# Patient Record
Sex: FEMALE | Race: WHITE | NOT HISPANIC OR LATINO
[De-identification: names, ages, dates, MRNs, and addresses within clinical notes are randomized per-mention and may not be internally consistent; named-entity substitution may affect disease eponyms.]

---

## 2017-01-13 ENCOUNTER — APPOINTMENT (OUTPATIENT)
Dept: FAMILY MEDICINE | Facility: CLINIC | Age: 72
End: 2017-01-13

## 2017-01-13 VITALS
HEART RATE: 68 BPM | SYSTOLIC BLOOD PRESSURE: 122 MMHG | HEIGHT: 60 IN | DIASTOLIC BLOOD PRESSURE: 70 MMHG | WEIGHT: 186.25 LBS | BODY MASS INDEX: 36.57 KG/M2

## 2017-01-13 DIAGNOSIS — F41.8 OTHER SPECIFIED ANXIETY DISORDERS: ICD-10-CM

## 2017-01-15 LAB
25(OH)D3 SERPL-MCNC: 29.5 NG/ML
ALBUMIN SERPL ELPH-MCNC: 4 G/DL
ALP BLD-CCNC: 102 U/L
ALT SERPL-CCNC: 51 U/L
ANION GAP SERPL CALC-SCNC: 14 MMOL/L
AST SERPL-CCNC: 35 U/L
BASOPHILS # BLD AUTO: 0.02 K/UL
BASOPHILS NFR BLD AUTO: 0.4 %
BILIRUB SERPL-MCNC: 0.6 MG/DL
BUN SERPL-MCNC: 11 MG/DL
CALCIUM SERPL-MCNC: 9.6 MG/DL
CHLORIDE SERPL-SCNC: 104 MMOL/L
CHOLEST SERPL-MCNC: 204 MG/DL
CHOLEST/HDLC SERPL: 2.9 RATIO
CO2 SERPL-SCNC: 26 MMOL/L
CREAT SERPL-MCNC: 0.74 MG/DL
EOSINOPHIL # BLD AUTO: 0.13 K/UL
EOSINOPHIL NFR BLD AUTO: 2.3 %
GLUCOSE SERPL-MCNC: 110 MG/DL
HBA1C MFR BLD HPLC: 5.9 %
HCT VFR BLD CALC: 41.9 %
HDLC SERPL-MCNC: 70 MG/DL
HGB BLD-MCNC: 14 G/DL
IMM GRANULOCYTES NFR BLD AUTO: 0.2 %
LDLC SERPL CALC-MCNC: 108 MG/DL
LYMPHOCYTES # BLD AUTO: 1.95 K/UL
LYMPHOCYTES NFR BLD AUTO: 34.8 %
MAN DIFF?: NORMAL
MCHC RBC-ENTMCNC: 30.9 PG
MCHC RBC-ENTMCNC: 33.4 GM/DL
MCV RBC AUTO: 92.5 FL
MONOCYTES # BLD AUTO: 0.43 K/UL
MONOCYTES NFR BLD AUTO: 7.7 %
NEUTROPHILS # BLD AUTO: 3.06 K/UL
NEUTROPHILS NFR BLD AUTO: 54.6 %
PLATELET # BLD AUTO: 196 K/UL
POTASSIUM SERPL-SCNC: 4 MMOL/L
PROT SERPL-MCNC: 7 G/DL
RBC # BLD: 4.53 M/UL
RBC # FLD: 14.6 %
SODIUM SERPL-SCNC: 144 MMOL/L
TRIGL SERPL-MCNC: 130 MG/DL
TSH SERPL-ACNC: 0.95 UIU/ML
WBC # FLD AUTO: 5.6 K/UL

## 2017-03-09 ENCOUNTER — APPOINTMENT (OUTPATIENT)
Dept: FAMILY MEDICINE | Facility: CLINIC | Age: 72
End: 2017-03-09

## 2017-05-09 ENCOUNTER — APPOINTMENT (OUTPATIENT)
Dept: UROGYNECOLOGY | Facility: CLINIC | Age: 72
End: 2017-05-09

## 2017-05-09 VITALS
SYSTOLIC BLOOD PRESSURE: 146 MMHG | DIASTOLIC BLOOD PRESSURE: 81 MMHG | HEIGHT: 60 IN | BODY MASS INDEX: 35.53 KG/M2 | WEIGHT: 181 LBS

## 2017-05-09 DIAGNOSIS — Z87.09 PERSONAL HISTORY OF OTHER DISEASES OF THE RESPIRATORY SYSTEM: ICD-10-CM

## 2017-05-09 DIAGNOSIS — K57.90 DIVERTICULOSIS OF INTESTINE, PART UNSPECIFIED, W/OUT PERFORATION OR ABSCESS W/OUT BLEEDING: ICD-10-CM

## 2017-05-09 DIAGNOSIS — K59.00 CONSTIPATION, UNSPECIFIED: ICD-10-CM

## 2017-05-09 DIAGNOSIS — Z87.42 PERSONAL HISTORY OF OTHER DISEASES OF THE FEMALE GENITAL TRACT: ICD-10-CM

## 2017-05-09 LAB
BILIRUB UR QL STRIP: NEGATIVE
CLARITY UR: CLEAR
COLLECTION METHOD: NORMAL
GLUCOSE UR-MCNC: NEGATIVE
HCG UR QL: 0.2 EU/DL
HGB UR QL STRIP.AUTO: NEGATIVE
KETONES UR-MCNC: NEGATIVE
LEUKOCYTE ESTERASE UR QL STRIP: NEGATIVE
NITRITE UR QL STRIP: NEGATIVE
PH UR STRIP: 6
PROT UR STRIP-MCNC: NEGATIVE
SP GR UR STRIP: 1

## 2017-05-10 ENCOUNTER — RESULT REVIEW (OUTPATIENT)
Age: 72
End: 2017-05-10

## 2017-05-11 ENCOUNTER — APPOINTMENT (OUTPATIENT)
Dept: UROGYNECOLOGY | Facility: CLINIC | Age: 72
End: 2017-05-11

## 2017-05-11 LAB — BACTERIA UR CULT: NORMAL

## 2017-05-25 ENCOUNTER — APPOINTMENT (OUTPATIENT)
Dept: UROGYNECOLOGY | Facility: CLINIC | Age: 72
End: 2017-05-25

## 2017-08-09 ENCOUNTER — APPOINTMENT (OUTPATIENT)
Dept: FAMILY MEDICINE | Facility: CLINIC | Age: 72
End: 2017-08-09
Payer: MEDICARE

## 2017-08-09 VITALS
SYSTOLIC BLOOD PRESSURE: 122 MMHG | OXYGEN SATURATION: 98 % | HEIGHT: 60 IN | WEIGHT: 187.38 LBS | HEART RATE: 65 BPM | DIASTOLIC BLOOD PRESSURE: 74 MMHG | BODY MASS INDEX: 36.79 KG/M2

## 2017-08-09 DIAGNOSIS — Z87.448 PERSONAL HISTORY OF OTHER DISEASES OF URINARY SYSTEM: ICD-10-CM

## 2017-08-09 DIAGNOSIS — R33.9 RETENTION OF URINE, UNSPECIFIED: ICD-10-CM

## 2017-08-09 DIAGNOSIS — Q80.0 ICHTHYOSIS VULGARIS: ICD-10-CM

## 2017-08-09 DIAGNOSIS — I51.9 HEART DISEASE, UNSPECIFIED: ICD-10-CM

## 2017-08-09 DIAGNOSIS — Z87.09 PERSONAL HISTORY OF OTHER DISEASES OF THE RESPIRATORY SYSTEM: ICD-10-CM

## 2017-08-09 PROCEDURE — 99244 OFF/OP CNSLTJ NEW/EST MOD 40: CPT

## 2017-08-09 RX ORDER — VALSARTAN 160 MG
CAPSULE ORAL
Refills: 0 | Status: DISCONTINUED | COMMUNITY
End: 2017-08-09

## 2017-08-09 RX ORDER — ATORVASTATIN CALCIUM 80 MG/1
TABLET, FILM COATED ORAL
Refills: 0 | Status: DISCONTINUED | COMMUNITY
End: 2017-08-09

## 2017-08-09 RX ORDER — CLOPIDOGREL 75 MG/1
TABLET, FILM COATED ORAL
Refills: 0 | Status: DISCONTINUED | COMMUNITY
End: 2017-08-09

## 2017-08-09 RX ORDER — ZOLPIDEM TARTRATE 5 MG/1
TABLET, FILM COATED ORAL
Refills: 0 | Status: DISCONTINUED | COMMUNITY
End: 2017-08-09

## 2017-08-09 RX ORDER — PANTOPRAZOLE SODIUM 40 MG/1
40 GRANULE, DELAYED RELEASE ORAL
Refills: 0 | Status: DISCONTINUED | COMMUNITY
End: 2017-08-09

## 2017-08-09 RX ORDER — ASPIRIN 81 MG/1
81 TABLET ORAL
Refills: 0 | Status: ACTIVE | COMMUNITY
Start: 2017-08-09

## 2017-08-09 RX ORDER — LEVOTHYROXINE SODIUM 137 UG/1
TABLET ORAL
Refills: 0 | Status: DISCONTINUED | COMMUNITY
End: 2017-08-09

## 2017-08-18 ENCOUNTER — RX RENEWAL (OUTPATIENT)
Age: 72
End: 2017-08-18

## 2017-08-21 ENCOUNTER — APPOINTMENT (OUTPATIENT)
Dept: FAMILY MEDICINE | Facility: CLINIC | Age: 72
End: 2017-08-21
Payer: COMMERCIAL

## 2017-08-21 VITALS
DIASTOLIC BLOOD PRESSURE: 80 MMHG | TEMPERATURE: 98.7 F | WEIGHT: 185 LBS | HEIGHT: 60 IN | SYSTOLIC BLOOD PRESSURE: 120 MMHG | BODY MASS INDEX: 36.32 KG/M2

## 2017-08-21 PROCEDURE — 99214 OFFICE O/P EST MOD 30 MIN: CPT

## 2017-08-28 ENCOUNTER — RX RENEWAL (OUTPATIENT)
Age: 72
End: 2017-08-28

## 2017-09-14 ENCOUNTER — APPOINTMENT (OUTPATIENT)
Dept: FAMILY MEDICINE | Facility: CLINIC | Age: 72
End: 2017-09-14
Payer: COMMERCIAL

## 2017-09-14 ENCOUNTER — MED ADMIN CHARGE (OUTPATIENT)
Age: 72
End: 2017-09-14

## 2017-09-14 VITALS
SYSTOLIC BLOOD PRESSURE: 120 MMHG | TEMPERATURE: 98.3 F | HEART RATE: 62 BPM | RESPIRATION RATE: 16 BRPM | DIASTOLIC BLOOD PRESSURE: 70 MMHG | WEIGHT: 185 LBS | BODY MASS INDEX: 36.32 KG/M2 | HEIGHT: 60 IN | OXYGEN SATURATION: 98 %

## 2017-09-14 DIAGNOSIS — Z87.09 PERSONAL HISTORY OF OTHER DISEASES OF THE RESPIRATORY SYSTEM: ICD-10-CM

## 2017-09-14 PROCEDURE — 90662 IIV NO PRSV INCREASED AG IM: CPT

## 2017-09-14 PROCEDURE — 99213 OFFICE O/P EST LOW 20 MIN: CPT | Mod: 25

## 2017-09-14 PROCEDURE — 96372 THER/PROPH/DIAG INJ SC/IM: CPT

## 2017-09-14 PROCEDURE — G0008: CPT

## 2017-09-14 RX ORDER — METHYLPRED ACET/NACL,ISO-OS/PF 40 MG/ML
40 VIAL (ML) INJECTION
Qty: 1 | Refills: 0 | Status: COMPLETED | OUTPATIENT
Start: 2017-09-14

## 2017-09-14 RX ORDER — CEFPROZIL 500 MG/1
500 TABLET ORAL
Qty: 20 | Refills: 0 | Status: DISCONTINUED | COMMUNITY
Start: 2017-08-21 | End: 2017-09-14

## 2017-09-14 RX ADMIN — METHYLPREDNISOLONE ACETATE 0 MG/ML: 40 INJECTION, SUSPENSION INTRA-ARTICULAR; INTRALESIONAL; INTRAMUSCULAR; SOFT TISSUE at 00:00

## 2017-09-20 ENCOUNTER — APPOINTMENT (OUTPATIENT)
Dept: FAMILY MEDICINE | Facility: CLINIC | Age: 72
End: 2017-09-20
Payer: COMMERCIAL

## 2017-09-20 VITALS
HEIGHT: 60 IN | DIASTOLIC BLOOD PRESSURE: 80 MMHG | TEMPERATURE: 98.9 F | OXYGEN SATURATION: 98 % | WEIGHT: 185 LBS | SYSTOLIC BLOOD PRESSURE: 136 MMHG | HEART RATE: 72 BPM | BODY MASS INDEX: 36.32 KG/M2

## 2017-09-20 PROCEDURE — 99213 OFFICE O/P EST LOW 20 MIN: CPT

## 2017-09-20 RX ORDER — METHYLPREDNISOLONE 4 MG/1
4 TABLET ORAL
Qty: 1 | Refills: 0 | Status: DISCONTINUED | COMMUNITY
Start: 2017-09-14 | End: 2017-09-20

## 2017-09-20 RX ORDER — MULTIVIT-MIN/FOLIC/VIT K/LYCOP 400-300MCG
1000 TABLET ORAL DAILY
Refills: 0 | Status: DISCONTINUED | COMMUNITY
End: 2017-09-20

## 2017-09-20 RX ORDER — AZELASTINE HYDROCHLORIDE 205.5 UG/1
0.15 SPRAY, METERED NASAL
Qty: 1 | Refills: 3 | Status: DISCONTINUED | COMMUNITY
Start: 2017-01-13 | End: 2017-09-20

## 2017-12-14 ENCOUNTER — RX RENEWAL (OUTPATIENT)
Age: 72
End: 2017-12-14

## 2018-01-08 ENCOUNTER — RX RENEWAL (OUTPATIENT)
Age: 73
End: 2018-01-08

## 2018-01-26 ENCOUNTER — RX RENEWAL (OUTPATIENT)
Age: 73
End: 2018-01-26

## 2018-03-08 ENCOUNTER — RESULT REVIEW (OUTPATIENT)
Age: 73
End: 2018-03-08

## 2018-03-14 ENCOUNTER — NON-APPOINTMENT (OUTPATIENT)
Age: 73
End: 2018-03-14

## 2018-03-14 ENCOUNTER — APPOINTMENT (OUTPATIENT)
Dept: FAMILY MEDICINE | Facility: CLINIC | Age: 73
End: 2018-03-14
Payer: MEDICARE

## 2018-03-14 VITALS
BODY MASS INDEX: 36.4 KG/M2 | TEMPERATURE: 97.7 F | HEIGHT: 60 IN | WEIGHT: 185.44 LBS | SYSTOLIC BLOOD PRESSURE: 134 MMHG | DIASTOLIC BLOOD PRESSURE: 82 MMHG | OXYGEN SATURATION: 99 % | HEART RATE: 88 BPM

## 2018-03-14 DIAGNOSIS — Z87.19 PERSONAL HISTORY OF OTHER DISEASES OF THE DIGESTIVE SYSTEM: ICD-10-CM

## 2018-03-14 DIAGNOSIS — Z87.898 PERSONAL HISTORY OF OTHER SPECIFIED CONDITIONS: ICD-10-CM

## 2018-03-14 DIAGNOSIS — Z01.818 ENCOUNTER FOR OTHER PREPROCEDURAL EXAMINATION: ICD-10-CM

## 2018-03-14 DIAGNOSIS — J31.0 CHRONIC RHINITIS: ICD-10-CM

## 2018-03-14 DIAGNOSIS — R10.2 PELVIC AND PERINEAL PAIN: ICD-10-CM

## 2018-03-14 DIAGNOSIS — Z92.29 PERSONAL HISTORY OF OTHER DRUG THERAPY: ICD-10-CM

## 2018-03-14 DIAGNOSIS — Z86.39 PERSONAL HISTORY OF OTHER ENDOCRINE, NUTRITIONAL AND METABOLIC DISEASE: ICD-10-CM

## 2018-03-14 PROCEDURE — 99214 OFFICE O/P EST MOD 30 MIN: CPT | Mod: 25

## 2018-03-14 PROCEDURE — 94010 BREATHING CAPACITY TEST: CPT

## 2018-03-14 PROCEDURE — 36415 COLL VENOUS BLD VENIPUNCTURE: CPT

## 2018-03-14 RX ORDER — BENZONATATE 200 MG/1
200 CAPSULE ORAL
Qty: 30 | Refills: 0 | Status: DISCONTINUED | COMMUNITY
Start: 2017-09-14 | End: 2018-03-14

## 2018-03-14 RX ORDER — AZITHROMYCIN 250 MG/1
250 TABLET, FILM COATED ORAL
Qty: 1 | Refills: 0 | Status: DISCONTINUED | COMMUNITY
Start: 2017-09-20 | End: 2018-03-14

## 2018-03-14 RX ORDER — IBUPROFEN 600 MG/1
600 TABLET, FILM COATED ORAL
Qty: 30 | Refills: 0 | Status: DISCONTINUED | COMMUNITY
Start: 2017-08-16 | End: 2018-03-14

## 2018-03-15 LAB
25(OH)D3 SERPL-MCNC: 27.7 NG/ML
ALBUMIN SERPL ELPH-MCNC: 4.4 G/DL
ALP BLD-CCNC: 100 U/L
ALT SERPL-CCNC: 37 U/L
ANION GAP SERPL CALC-SCNC: 14 MMOL/L
AST SERPL-CCNC: 32 U/L
BASOPHILS # BLD AUTO: 0.02 K/UL
BASOPHILS NFR BLD AUTO: 0.3 %
BILIRUB SERPL-MCNC: 0.8 MG/DL
BUN SERPL-MCNC: 14 MG/DL
CALCIUM SERPL-MCNC: 10.3 MG/DL
CHLORIDE SERPL-SCNC: 101 MMOL/L
CHOLEST SERPL-MCNC: 193 MG/DL
CHOLEST/HDLC SERPL: 2.9 RATIO
CO2 SERPL-SCNC: 26 MMOL/L
CREAT SERPL-MCNC: 0.79 MG/DL
EOSINOPHIL # BLD AUTO: 0.08 K/UL
EOSINOPHIL NFR BLD AUTO: 1.3 %
GLUCOSE SERPL-MCNC: 100 MG/DL
HBA1C MFR BLD HPLC: 5.7 %
HCT VFR BLD CALC: 43.8 %
HDLC SERPL-MCNC: 66 MG/DL
HGB BLD-MCNC: 14.1 G/DL
IMM GRANULOCYTES NFR BLD AUTO: 0.2 %
LDLC SERPL CALC-MCNC: 103 MG/DL
LYMPHOCYTES # BLD AUTO: 1.97 K/UL
LYMPHOCYTES NFR BLD AUTO: 32 %
MAN DIFF?: NORMAL
MCHC RBC-ENTMCNC: 30.6 PG
MCHC RBC-ENTMCNC: 32.2 GM/DL
MCV RBC AUTO: 95 FL
MONOCYTES # BLD AUTO: 0.27 K/UL
MONOCYTES NFR BLD AUTO: 4.4 %
NEUTROPHILS # BLD AUTO: 3.81 K/UL
NEUTROPHILS NFR BLD AUTO: 61.8 %
PLATELET # BLD AUTO: 211 K/UL
POTASSIUM SERPL-SCNC: 4.1 MMOL/L
PROT SERPL-MCNC: 7.5 G/DL
RBC # BLD: 4.61 M/UL
RBC # FLD: 15.5 %
SODIUM SERPL-SCNC: 141 MMOL/L
TRIGL SERPL-MCNC: 120 MG/DL
TSH SERPL-ACNC: 3.29 UIU/ML
WBC # FLD AUTO: 6.16 K/UL

## 2018-08-02 ENCOUNTER — OUTPATIENT (OUTPATIENT)
Dept: OUTPATIENT SERVICES | Facility: HOSPITAL | Age: 73
LOS: 1 days | End: 2018-08-02
Payer: MEDICARE

## 2018-08-02 PROCEDURE — 71046 X-RAY EXAM CHEST 2 VIEWS: CPT | Mod: 26

## 2018-08-05 ENCOUNTER — TRANSCRIPTION ENCOUNTER (OUTPATIENT)
Age: 73
End: 2018-08-05

## 2018-08-07 ENCOUNTER — EMERGENCY (EMERGENCY)
Facility: HOSPITAL | Age: 73
LOS: 1 days | End: 2018-08-07
Payer: MEDICARE

## 2018-08-07 PROCEDURE — 74177 CT ABD & PELVIS W/CONTRAST: CPT | Mod: 26

## 2018-08-07 PROCEDURE — 99284 EMERGENCY DEPT VISIT MOD MDM: CPT

## 2018-08-14 ENCOUNTER — TRANSCRIPTION ENCOUNTER (OUTPATIENT)
Age: 73
End: 2018-08-14

## 2018-09-26 ENCOUNTER — APPOINTMENT (OUTPATIENT)
Dept: FAMILY MEDICINE | Facility: CLINIC | Age: 73
End: 2018-09-26
Payer: MEDICARE

## 2018-09-26 VITALS
WEIGHT: 185 LBS | OXYGEN SATURATION: 98 % | BODY MASS INDEX: 36.32 KG/M2 | SYSTOLIC BLOOD PRESSURE: 150 MMHG | RESPIRATION RATE: 16 BRPM | HEIGHT: 60 IN | DIASTOLIC BLOOD PRESSURE: 70 MMHG | HEART RATE: 61 BPM

## 2018-09-26 DIAGNOSIS — Z86.39 PERSONAL HISTORY OF OTHER ENDOCRINE, NUTRITIONAL AND METABOLIC DISEASE: ICD-10-CM

## 2018-09-26 DIAGNOSIS — B35.1 TINEA UNGUIUM: ICD-10-CM

## 2018-09-26 DIAGNOSIS — K57.30 DIVERTICULOSIS OF LARGE INTESTINE W/OUT PERFORATION OR ABSCESS W/OUT BLEEDING: ICD-10-CM

## 2018-09-26 PROCEDURE — G0008: CPT

## 2018-09-26 PROCEDURE — 90662 IIV NO PRSV INCREASED AG IM: CPT

## 2018-09-26 PROCEDURE — 99214 OFFICE O/P EST MOD 30 MIN: CPT | Mod: 25

## 2018-09-26 NOTE — REVIEW OF SYSTEMS
[Shortness Of Breath] : no shortness of breath [Wheezing] : no wheezing [Cough] : cough [Dyspnea on Exertion] : no dyspnea on exertion [Negative] : Genitourinary

## 2018-09-26 NOTE — PHYSICAL EXAM
[No Acute Distress] : no acute distress [Well-Appearing] : well-appearing [Normal Oropharynx] : the oropharynx was normal [Supple] : supple [No Lymphadenopathy] : no lymphadenopathy [Clear to Auscultation] : lungs were clear to auscultation bilaterally [Regular Rhythm] : with a regular rhythm [No Murmur] : no murmur heard [Soft] : abdomen soft

## 2018-09-26 NOTE — HISTORY OF PRESENT ILLNESS
[de-identified] : Patient seen by pulmonary for chronic cough given Singulair, with no benefit, nonsedating antihistamines helpful. Pulmonary fields. Cough is upper airway secondary to postnasal drip. Patient also has reflux has followup appointment with pulmonary tomorrow.\par \par Recent colonoscopy for diverticulitis, showed multiple diverticular.\par \par Recently saw cardiology. No cardiovascular symptoms\par \par Hypothyroid recent TSH normal on level thyroxine

## 2018-09-26 NOTE — PLAN
[FreeTextEntry1] : Currently stable. Continue current meds flu shot given\par \par Change valsartan to Olmesartan due to possible contamination

## 2018-12-21 ENCOUNTER — APPOINTMENT (OUTPATIENT)
Dept: FAMILY MEDICINE | Facility: CLINIC | Age: 73
End: 2018-12-21

## 2019-01-02 ENCOUNTER — APPOINTMENT (OUTPATIENT)
Dept: FAMILY MEDICINE | Facility: CLINIC | Age: 74
End: 2019-01-02

## 2019-02-21 ENCOUNTER — RX RENEWAL (OUTPATIENT)
Age: 74
End: 2019-02-21

## 2019-03-11 ENCOUNTER — LABORATORY RESULT (OUTPATIENT)
Age: 74
End: 2019-03-11

## 2019-03-11 ENCOUNTER — APPOINTMENT (OUTPATIENT)
Dept: FAMILY MEDICINE | Facility: CLINIC | Age: 74
End: 2019-03-11
Payer: MEDICARE

## 2019-03-11 VITALS
WEIGHT: 185.25 LBS | HEART RATE: 62 BPM | SYSTOLIC BLOOD PRESSURE: 130 MMHG | HEIGHT: 60 IN | OXYGEN SATURATION: 97 % | RESPIRATION RATE: 16 BRPM | BODY MASS INDEX: 36.37 KG/M2 | DIASTOLIC BLOOD PRESSURE: 72 MMHG

## 2019-03-11 DIAGNOSIS — B37.9 CANDIDIASIS, UNSPECIFIED: ICD-10-CM

## 2019-03-11 DIAGNOSIS — M70.52 OTHER BURSITIS OF KNEE, RIGHT KNEE: ICD-10-CM

## 2019-03-11 DIAGNOSIS — M70.51 OTHER BURSITIS OF KNEE, RIGHT KNEE: ICD-10-CM

## 2019-03-11 PROCEDURE — 99214 OFFICE O/P EST MOD 30 MIN: CPT | Mod: 25

## 2019-03-11 PROCEDURE — 20605 DRAIN/INJ JOINT/BURSA W/O US: CPT

## 2019-03-11 PROCEDURE — G0438: CPT

## 2019-03-11 RX ORDER — METHYLPRED ACET/NACL,ISO-OS/PF 40 MG/ML
40 VIAL (ML) INJECTION
Qty: 1 | Refills: 0 | Status: COMPLETED | OUTPATIENT
Start: 2019-03-11

## 2019-03-11 RX ORDER — POTASSIUM CHLORIDE 1500 MG/1
20 TABLET, EXTENDED RELEASE ORAL
Qty: 3 | Refills: 0 | Status: DISCONTINUED | COMMUNITY
Start: 2017-08-07 | End: 2019-03-11

## 2019-03-11 RX ADMIN — METHYLPREDNISOLONE ACETATE 0 MG/ML: 40 INJECTION, SUSPENSION INTRA-ARTICULAR; INTRALESIONAL; INTRAMUSCULAR; SOFT TISSUE at 00:00

## 2019-03-11 NOTE — REVIEW OF SYSTEMS
[Cough] : cough [Frequency] : frequency [Joint Pain] : joint pain [Skin Rash] : skin rash [Negative] : Gastrointestinal [Dyspnea on Exertion] : no dyspnea on exertion

## 2019-03-11 NOTE — ASSESSMENT
[FreeTextEntry1] : As per history of present illness, bilateral knee injection. Health care proxy given request for shingles vaccine given. Continue current meds see me 6 months\par \par therapeutic trial of Breo for chronic cough\par \par decreased PPI dose

## 2019-03-11 NOTE — PHYSICAL EXAM
[No Acute Distress] : no acute distress [Normal TMs] : both tympanic membranes were normal [Supple] : supple [No Lymphadenopathy] : no lymphadenopathy [Thyroid Normal, No Nodules] : the thyroid was normal and there were no nodules present [Clear to Auscultation] : lungs were clear to auscultation bilaterally [Regular Rhythm] : with a regular rhythm [Normal S1, S2] : normal S1 and S2 [No Edema] : there was no peripheral edema [Soft] : abdomen soft [Normal Anterior Cervical Nodes] : no anterior cervical lymphadenopathy [Normal Affect] : the affect was normal [Normal Insight/Judgement] : insight and judgment were intact [de-identified] : Tenderness of anserine bursa bilaterallyinjected with 1 cc Marcaine and 40 mg Medrol [de-identified] : As per historypresent illness

## 2019-03-11 NOTE — HISTORY OF PRESENT ILLNESS
5 [de-identified] : Patient here for annual checkup.\par \par She has bilateral knee pain, consistent with anserine bursitis.\par \par She has persistent cough has been evaluated by pulmonology. We'll give therapeutic trial of Breo.\par \par She has erythema itching and pain in her natiche full. Consistent with candidiasis.\par \par Coronary artery disease. Saw cardiology last month. No issues\par \par  has posttraumatic stress disorder from [place] and some symptoms consistent with normal pressure hydrocephalus. She has had 3 neurosurgical opinions. He is becoming more forgetful difficulty walking\par \par Frequent urination, status post bladder sling.\par \par Hyperlipidemia, hypertension, hypothyroidism, controlled on medication.\par \par Insomnia uses Ambien nightly\par \par Chronic GERD will try and decrease PPI use by alternating with H2 blocker

## 2019-03-11 NOTE — HEALTH RISK ASSESSMENT
[Good] : ~his/her~ current health as good [Fair] :  ~his/her~ mood as fair [No falls in past year] : Patient reported no falls in the past year [Patient reported mammogram was normal] : Patient reported mammogram was normal [Patient reported colonoscopy was abnormal] : Patient reported colonoscopy was abnormal [] :  [# Of Children ___] : has [unfilled] children [Fully functional (bathing, dressing, toileting, transferring, walking, feeding)] : Fully functional (bathing, dressing, toileting, transferring, walking, feeding) [Fully functional (using the telephone, shopping, preparing meals, housekeeping, doing laundry, using] : Fully functional and needs no help or supervision to perform IADLs (using the telephone, shopping, preparing meals, housekeeping, doing laundry, using transportation, managing medications and managing finances) [Designated Healthcare Proxy] : Designated healthcare proxy [] : No [de-identified] : Sedentary [de-identified] : Healthy [Change in mental status noted] : No change in mental status noted [Reports changes in dental health] : Reports no changes in dental health [MammogramDate] : 2019 [ColonoscopyDate] : 2018 [ColonoscopyComments] : diverticulosis [AdvancecareDate] : today

## 2019-03-12 LAB
ALBUMIN SERPL ELPH-MCNC: 4.3 G/DL
ALP BLD-CCNC: 104 U/L
ALT SERPL-CCNC: 68 U/L
ANION GAP SERPL CALC-SCNC: 14 MMOL/L
AST SERPL-CCNC: 38 U/L
BASOPHILS # BLD AUTO: 0.04 K/UL
BASOPHILS NFR BLD AUTO: 0.7 %
BILIRUB SERPL-MCNC: 0.4 MG/DL
BUN SERPL-MCNC: 15 MG/DL
CALCIUM SERPL-MCNC: 9.8 MG/DL
CHLORIDE SERPL-SCNC: 102 MMOL/L
CHOLEST SERPL-MCNC: 170 MG/DL
CHOLEST/HDLC SERPL: 2.8 RATIO
CO2 SERPL-SCNC: 27 MMOL/L
CREAT SERPL-MCNC: 0.74 MG/DL
EOSINOPHIL # BLD AUTO: 0.23 K/UL
EOSINOPHIL NFR BLD AUTO: 3.8 %
GLUCOSE SERPL-MCNC: 108 MG/DL
HBA1C MFR BLD HPLC: 5.8 %
HCT VFR BLD CALC: 42.7 %
HDLC SERPL-MCNC: 60 MG/DL
HGB BLD-MCNC: 13.5 G/DL
IMM GRANULOCYTES NFR BLD AUTO: 0.2 %
LDLC SERPL CALC-MCNC: 76 MG/DL
LYMPHOCYTES # BLD AUTO: 2.17 K/UL
LYMPHOCYTES NFR BLD AUTO: 36.1 %
MAN DIFF?: NORMAL
MCHC RBC-ENTMCNC: 30.1 PG
MCHC RBC-ENTMCNC: 31.6 GM/DL
MCV RBC AUTO: 95.1 FL
MONOCYTES # BLD AUTO: 0.43 K/UL
MONOCYTES NFR BLD AUTO: 7.2 %
NEUTROPHILS # BLD AUTO: 3.13 K/UL
NEUTROPHILS NFR BLD AUTO: 52 %
PLATELET # BLD AUTO: 202 K/UL
POTASSIUM SERPL-SCNC: 4 MMOL/L
PROT SERPL-MCNC: 6.8 G/DL
RBC # BLD: 4.49 M/UL
RBC # FLD: 14.7 %
SODIUM SERPL-SCNC: 143 MMOL/L
TRIGL SERPL-MCNC: 169 MG/DL
TSH SERPL-ACNC: 1.62 UIU/ML
WBC # FLD AUTO: 6.01 K/UL

## 2019-04-16 ENCOUNTER — RECORD ABSTRACTING (OUTPATIENT)
Age: 74
End: 2019-04-16

## 2019-04-16 DIAGNOSIS — Z87.11 PERSONAL HISTORY OF PEPTIC ULCER DISEASE: ICD-10-CM

## 2019-04-16 DIAGNOSIS — N95.2 POSTMENOPAUSAL ATROPHIC VAGINITIS: ICD-10-CM

## 2019-04-16 DIAGNOSIS — Z80.3 FAMILY HISTORY OF MALIGNANT NEOPLASM OF BREAST: ICD-10-CM

## 2019-04-16 DIAGNOSIS — Z83.3 FAMILY HISTORY OF DIABETES MELLITUS: ICD-10-CM

## 2019-04-16 DIAGNOSIS — Z92.89 PERSONAL HISTORY OF OTHER MEDICAL TREATMENT: ICD-10-CM

## 2019-04-16 DIAGNOSIS — Z80.0 FAMILY HISTORY OF MALIGNANT NEOPLASM OF DIGESTIVE ORGANS: ICD-10-CM

## 2019-04-16 LAB
CYTOLOGY CVX/VAG DOC THIN PREP: NORMAL
CYTOLOGY CVX/VAG DOC THIN PREP: NORMAL

## 2019-04-17 ENCOUNTER — APPOINTMENT (OUTPATIENT)
Dept: OBGYN | Facility: CLINIC | Age: 74
End: 2019-04-17
Payer: MEDICARE

## 2019-04-17 VITALS
DIASTOLIC BLOOD PRESSURE: 76 MMHG | SYSTOLIC BLOOD PRESSURE: 120 MMHG | WEIGHT: 185 LBS | BODY MASS INDEX: 36.32 KG/M2 | HEIGHT: 60 IN

## 2019-04-17 DIAGNOSIS — Z01.419 ENCOUNTER FOR GYNECOLOGICAL EXAMINATION (GENERAL) (ROUTINE) W/OUT ABNORMAL FINDINGS: ICD-10-CM

## 2019-04-17 DIAGNOSIS — R35.0 FREQUENCY OF MICTURITION: ICD-10-CM

## 2019-04-17 DIAGNOSIS — R35.1 NOCTURIA: ICD-10-CM

## 2019-04-17 DIAGNOSIS — R31.29 OTHER MICROSCOPIC HEMATURIA: ICD-10-CM

## 2019-04-17 DIAGNOSIS — Z12.31 ENCOUNTER FOR SCREENING MAMMOGRAM FOR MALIGNANT NEOPLASM OF BREAST: ICD-10-CM

## 2019-04-17 LAB
BILIRUB UR QL STRIP: NORMAL
CLARITY UR: CLEAR
COLLECTION METHOD: NORMAL
DATE COLLECTED: NORMAL
GLUCOSE UR-MCNC: NORMAL
HCG UR QL: 0.2 EU/DL
HEMOCCULT SP1 STL QL: NEGATIVE
HGB UR QL STRIP.AUTO: NORMAL
KETONES UR-MCNC: NORMAL
LEUKOCYTE ESTERASE UR QL STRIP: NORMAL
NITRITE UR QL STRIP: NORMAL
PH UR STRIP: 6
PROT UR STRIP-MCNC: NORMAL
QUALITY CONTROL: YES
SP GR UR STRIP: 1.01

## 2019-04-17 PROCEDURE — 81003 URINALYSIS AUTO W/O SCOPE: CPT | Mod: QW

## 2019-04-17 PROCEDURE — G0101: CPT

## 2019-04-17 PROCEDURE — 82270 OCCULT BLOOD FECES: CPT

## 2019-04-17 RX ORDER — AMMONIUM LACTATE 12 %
12 CREAM (GRAM) TOPICAL TWICE DAILY
Qty: 1 | Refills: 6 | Status: COMPLETED | COMMUNITY
Start: 2017-08-09 | End: 2019-04-17

## 2019-04-17 RX ORDER — MULTIVITAMIN
TABLET ORAL DAILY
Refills: 0 | Status: COMPLETED | COMMUNITY
End: 2019-04-17

## 2019-04-17 RX ORDER — PANTOPRAZOLE 40 MG/1
40 TABLET, DELAYED RELEASE ORAL
Qty: 90 | Refills: 3 | Status: COMPLETED | COMMUNITY
Start: 2019-02-21 | End: 2019-04-17

## 2019-04-17 RX ORDER — VALSARTAN 80 MG/1
80 TABLET ORAL
Refills: 0 | Status: COMPLETED | COMMUNITY
End: 2019-04-17

## 2019-04-17 NOTE — HISTORY OF PRESENT ILLNESS
[Last Bone Density ___] : Last bone density studies [unfilled] [Current] : cancer screening reviewed and current [Last Pap ___] : Last cervical pap smear was [unfilled] [Last Pap Smear ___] : last Papanicolaou cytology done [unfilled] [Last Mammogram ___] : last mammogram done [unfilled] [Menarche Age: ____] : age at menarche was [unfilled] [Sexually Active] : is sexually active [Monogamous] : is monogamous [Male ___] : [unfilled] male [Last Colonoscopy ___] : Last colonoscopy [unfilled] [1 Year Ago] : 1 year ago [Contraception] : does not use contraception

## 2019-04-17 NOTE — COUNSELING
[Breast Self Exam] : breast self exam [Vitamins/Supplements] : vitamins/supplements [Exercise] : exercise [Nutrition] : nutrition [Sunscreen] : sunscreen

## 2019-04-17 NOTE — PHYSICAL EXAM
[Awake] : awake [Alert] : alert [Soft] : soft [Oriented x3] : oriented to person, place, and time [No Bleeding] : there was no active vaginal bleeding [Uterine Adnexae] : were not tender and not enlarged [No Tenderness] : no rectal tenderness [Normal rectal exam] : was normal [Normal Brown Stool] : was normal and brown [None] : there was no rectal abscess [Normal Appearance] : was normal in appearance [Examination Of The Breasts] : a normal appearance [Normal] : normal [No Masses] : no breast masses were palpable [Soft, Nontender] : the abdomen was soft and nontender [No HSM] : no hepatosplenomegaly noted [No Mass] : no masses were palpated [Absent] : absent [Acute Distress] : no acute distress [Mass] : no breast mass [Nipple Discharge] : no nipple discharge [Axillary LAD] : no axillary lymphadenopathy [Tender] : non tender [Internal Hemorrhoid] : no internal hemorrhoids were present [External Hemorrhoid] : no external hemorrhoids were present [Skin Tags] : no residual hemorrhoidal skin tags

## 2019-04-18 LAB
APPEARANCE: CLEAR
BACTERIA: NEGATIVE
BILIRUBIN URINE: NEGATIVE
BLOOD URINE: NEGATIVE
COLOR: NORMAL
GLUCOSE QUALITATIVE U: NEGATIVE
HYALINE CASTS: 0 /LPF
KETONES URINE: NEGATIVE
LEUKOCYTE ESTERASE URINE: NEGATIVE
MICROSCOPIC-UA: NORMAL
NITRITE URINE: NEGATIVE
PH URINE: 6.5
PROTEIN URINE: NEGATIVE
RED BLOOD CELLS URINE: 0 /HPF
SPECIFIC GRAVITY URINE: 1.01
SQUAMOUS EPITHELIAL CELLS: 1 /HPF
UROBILINOGEN URINE: NORMAL
WHITE BLOOD CELLS URINE: 0 /HPF

## 2019-04-19 LAB — BACTERIA UR CULT: NORMAL

## 2019-04-23 LAB — URINE CYTOLOGY: NORMAL

## 2019-05-13 RX ORDER — OLMESARTAN MEDOXOMIL AND HYDROCHLOROTHIAZIDE 20; 12.5 MG/1; MG/1
20-12.5 TABLET ORAL DAILY
Qty: 90 | Refills: 3 | Status: DISCONTINUED | COMMUNITY
Start: 2018-09-26 | End: 2019-05-13

## 2019-06-12 ENCOUNTER — APPOINTMENT (OUTPATIENT)
Dept: FAMILY MEDICINE | Facility: CLINIC | Age: 74
End: 2019-06-12
Payer: MEDICARE

## 2019-06-12 VITALS
HEIGHT: 60 IN | SYSTOLIC BLOOD PRESSURE: 128 MMHG | DIASTOLIC BLOOD PRESSURE: 80 MMHG | WEIGHT: 185 LBS | BODY MASS INDEX: 36.32 KG/M2 | OXYGEN SATURATION: 98 % | HEART RATE: 58 BPM

## 2019-06-12 DIAGNOSIS — Z51.81 ENCOUNTER FOR THERAPEUTIC DRUG LVL MONITORING: ICD-10-CM

## 2019-06-12 PROCEDURE — 99214 OFFICE O/P EST MOD 30 MIN: CPT

## 2019-06-12 RX ORDER — RANITIDINE 300 MG/1
300 TABLET ORAL
Qty: 90 | Refills: 2 | Status: DISCONTINUED | COMMUNITY
Start: 2019-03-11 | End: 2019-06-12

## 2019-06-12 RX ORDER — LEVOTHYROXINE SODIUM 100 UG/1
100 TABLET ORAL
Refills: 0 | Status: COMPLETED | COMMUNITY

## 2019-06-12 RX ORDER — ATORVASTATIN CALCIUM 40 MG/1
40 TABLET, FILM COATED ORAL
Refills: 0 | Status: COMPLETED | COMMUNITY

## 2019-06-12 RX ORDER — FLUTICASONE FUROATE AND VILANTEROL TRIFENATATE 200; 25 UG/1; UG/1
200-25 POWDER RESPIRATORY (INHALATION)
Qty: 1 | Refills: 11 | Status: DISCONTINUED | COMMUNITY
Start: 2019-03-11 | End: 2019-06-12

## 2019-06-12 NOTE — ASSESSMENT
[FreeTextEntry1] : Lorazepam as needed for anxiety with social outlets discussed\par \par Continue on PPI EGD 2015 showed hiatal hernia\par \par Cough medicine intermittently\par \par Warned of interactions of these multiple drowsiness inducing drugs

## 2019-06-12 NOTE — HISTORY OF PRESENT ILLNESS
[FreeTextEntry8] : Recurrent intermittent anxiety due to situation with  who has posttraumatic stress disorder and hydrocephalus and has become a shell of himself patient feels trapped she has moved out to [place] has lost her social support.  Discussion held as to strategies she can use to maintain her independence and become happy again\par \par She continues to have a persistent chronic cough which responds to occasional codeine cough medicine\par \par She requires daily PPI to control heartburn H2 blockers are insufficient

## 2019-06-13 ENCOUNTER — RX RENEWAL (OUTPATIENT)
Age: 74
End: 2019-06-13

## 2019-06-13 LAB
FOLATE SERPL-MCNC: 17.1 NG/ML
MAGNESIUM SERPL-MCNC: 2 MG/DL
VIT B12 SERPL-MCNC: 1055 PG/ML

## 2019-06-13 RX ORDER — HYDROCODONE POLISTIREX AND CHLORPHENIRAMINE POLISTIREX 10; 8 MG/5ML; MG/5ML
10-8 SUSPENSION, EXTENDED RELEASE ORAL
Qty: 1 | Refills: 0 | Status: COMPLETED | COMMUNITY
Start: 2017-08-21 | End: 2019-06-13

## 2019-06-18 ENCOUNTER — RX RENEWAL (OUTPATIENT)
Age: 74
End: 2019-06-18

## 2019-08-19 ENCOUNTER — RX RENEWAL (OUTPATIENT)
Age: 74
End: 2019-08-19

## 2019-09-12 ENCOUNTER — APPOINTMENT (OUTPATIENT)
Dept: FAMILY MEDICINE | Facility: CLINIC | Age: 74
End: 2019-09-12
Payer: MEDICARE

## 2019-09-12 VITALS
SYSTOLIC BLOOD PRESSURE: 130 MMHG | HEIGHT: 60 IN | WEIGHT: 185 LBS | HEART RATE: 56 BPM | TEMPERATURE: 98.5 F | OXYGEN SATURATION: 97 % | RESPIRATION RATE: 16 BRPM | BODY MASS INDEX: 36.32 KG/M2 | DIASTOLIC BLOOD PRESSURE: 80 MMHG

## 2019-09-12 DIAGNOSIS — Z87.09 PERSONAL HISTORY OF OTHER DISEASES OF THE RESPIRATORY SYSTEM: ICD-10-CM

## 2019-09-12 PROCEDURE — 99214 OFFICE O/P EST MOD 30 MIN: CPT

## 2019-10-08 ENCOUNTER — RX RENEWAL (OUTPATIENT)
Age: 74
End: 2019-10-08

## 2019-10-16 ENCOUNTER — FORM ENCOUNTER (OUTPATIENT)
Age: 74
End: 2019-10-16

## 2019-10-17 ENCOUNTER — APPOINTMENT (OUTPATIENT)
Dept: RADIOLOGY | Facility: CLINIC | Age: 74
End: 2019-10-17
Payer: MEDICARE

## 2019-10-17 ENCOUNTER — OUTPATIENT (OUTPATIENT)
Dept: OUTPATIENT SERVICES | Facility: HOSPITAL | Age: 74
LOS: 1 days | End: 2019-10-17
Payer: MEDICARE

## 2019-10-17 ENCOUNTER — APPOINTMENT (OUTPATIENT)
Dept: FAMILY MEDICINE | Facility: CLINIC | Age: 74
End: 2019-10-17
Payer: MEDICARE

## 2019-10-17 VITALS
BODY MASS INDEX: 36.32 KG/M2 | OXYGEN SATURATION: 96 % | TEMPERATURE: 97.8 F | HEART RATE: 52 BPM | HEIGHT: 60 IN | DIASTOLIC BLOOD PRESSURE: 70 MMHG | WEIGHT: 185 LBS | SYSTOLIC BLOOD PRESSURE: 105 MMHG

## 2019-10-17 DIAGNOSIS — R05 COUGH: ICD-10-CM

## 2019-10-17 PROCEDURE — 71046 X-RAY EXAM CHEST 2 VIEWS: CPT

## 2019-10-17 PROCEDURE — 71046 X-RAY EXAM CHEST 2 VIEWS: CPT | Mod: 26

## 2019-10-17 PROCEDURE — 99213 OFFICE O/P EST LOW 20 MIN: CPT

## 2019-10-17 RX ORDER — CLOPIDOGREL 75 MG/1
75 TABLET, FILM COATED ORAL
Refills: 0 | Status: DISCONTINUED | COMMUNITY
End: 2019-10-17

## 2019-10-17 RX ORDER — CEFPROZIL 500 MG/1
500 TABLET ORAL
Qty: 20 | Refills: 0 | Status: DISCONTINUED | COMMUNITY
Start: 2019-09-12 | End: 2019-10-17

## 2019-10-17 RX ORDER — MONTELUKAST 10 MG/1
10 TABLET, FILM COATED ORAL
Qty: 30 | Refills: 0 | Status: DISCONTINUED | COMMUNITY
Start: 2019-03-01 | End: 2019-10-17

## 2019-11-11 ENCOUNTER — APPOINTMENT (OUTPATIENT)
Dept: ULTRASOUND IMAGING | Facility: CLINIC | Age: 74
End: 2019-11-11
Payer: MEDICARE

## 2019-11-11 ENCOUNTER — TRANSCRIPTION ENCOUNTER (OUTPATIENT)
Age: 74
End: 2019-11-11

## 2019-11-11 PROCEDURE — 76882 US LMTD JT/FCL EVL NVASC XTR: CPT | Mod: LT

## 2019-11-18 ENCOUNTER — APPOINTMENT (OUTPATIENT)
Dept: FAMILY MEDICINE | Facility: CLINIC | Age: 74
End: 2019-11-18
Payer: MEDICARE

## 2019-11-18 VITALS
HEART RATE: 67 BPM | DIASTOLIC BLOOD PRESSURE: 72 MMHG | WEIGHT: 184.25 LBS | HEIGHT: 60 IN | OXYGEN SATURATION: 98 % | SYSTOLIC BLOOD PRESSURE: 110 MMHG | BODY MASS INDEX: 36.17 KG/M2

## 2019-11-18 VITALS — TEMPERATURE: 97.7 F

## 2019-11-18 PROCEDURE — 99214 OFFICE O/P EST MOD 30 MIN: CPT

## 2019-11-18 NOTE — HISTORY OF PRESENT ILLNESS
[FreeTextEntry8] : Called yesterday complaining of 2 days of left lower quadrant pain with low-grade fever and constipation consistent with diverticulitis patient has had several episodes in the past\par \par Colonoscopy last year showed diverticulosis\par \par Augmentin 875 twice daily and liquid diet initiated patient feeling somewhat better today with decreased pain and no further fever

## 2019-11-18 NOTE — PHYSICAL EXAM
[No Acute Distress] : no acute distress [Normal] : normal rate, regular rhythm, normal S1 and S2 and no murmur heard [Soft] : abdomen soft [de-identified] : Left lower quadrant tenderness no rebound

## 2019-11-18 NOTE — ASSESSMENT
[FreeTextEntry1] : Mild diverticulitis responding to oral antibiotics.  Continue Augmentin for 10 days liquid diet followed by low fiber diet as pain subsides then high-fiber diet for maintenance if fails to respond completely will need to see GI

## 2020-01-02 ENCOUNTER — APPOINTMENT (OUTPATIENT)
Dept: FAMILY MEDICINE | Facility: CLINIC | Age: 75
End: 2020-01-02
Payer: MEDICARE

## 2020-01-02 ENCOUNTER — APPOINTMENT (OUTPATIENT)
Dept: THORACIC SURGERY | Facility: CLINIC | Age: 75
End: 2020-01-02
Payer: MEDICARE

## 2020-01-02 VITALS — TEMPERATURE: 98.7 F

## 2020-01-02 PROCEDURE — 90750 HZV VACC RECOMBINANT IM: CPT | Mod: GY

## 2020-01-02 PROCEDURE — 99203 OFFICE O/P NEW LOW 30 MIN: CPT

## 2020-01-02 PROCEDURE — 90471 IMMUNIZATION ADMIN: CPT

## 2020-01-04 NOTE — HISTORY OF PRESENT ILLNESS
[FreeTextEntry1] : Mr. Colunga is a 74 year old female referred by Dr. Boss for initial evaluation of persistent cough. She has as past medical history significant for  Diverticulitis, Candidiasis, Coronary Artery Disease, GERD, Hypertension, Hyperlipidemia and persistent cough.

## 2020-01-04 NOTE — PHYSICAL EXAM
[General Appearance - Alert] : alert [General Appearance - Well Nourished] : well nourished [General Appearance - In No Acute Distress] : in no acute distress [Neck Appearance] : the appearance of the neck was normal [Neck Cervical Mass (___cm)] : no neck mass was observed [Respiration, Rhythm And Depth] : normal respiratory rhythm and effort [Chest Visual Inspection Thoracic Asymmetry] : no chest asymmetry [Exaggerated Use Of Accessory Muscles For Inspiration] : no accessory muscle use [Examination Of The Chest] : the chest was normal in appearance [Abdomen Soft] : soft [Bowel Sounds] : normal bowel sounds [Cervical Lymph Nodes Enlarged Posterior Bilaterally] : posterior cervical [Abdomen Tenderness] : non-tender [Cervical Lymph Nodes Enlarged Anterior Bilaterally] : anterior cervical [Abnormal Walk] : normal gait [Supraclavicular Lymph Nodes Enlarged Bilaterally] : supraclavicular [Nail Clubbing] : no clubbing  or cyanosis of the fingernails [Musculoskeletal - Swelling] : no joint swelling seen [Skin Color & Pigmentation] : normal skin color and pigmentation [] : no rash [Skin Turgor] : normal skin turgor [Oriented To Time, Place, And Person] : oriented to person, place, and time

## 2020-01-04 NOTE — CONSULT LETTER
[Dear  ___] : Dear  [unfilled], [Consult Letter:] : I had the pleasure of evaluating your patient, [unfilled]. [Consult Closing:] : Thank you very much for allowing me to participate in the care of this patient.  If you have any questions, please do not hesitate to contact me. [Please see my note below.] : Please see my note below. [Sincerely,] : Sincerely, [FreeTextEntry3] : Morgan Chopra MD\par Director of Thoracic, [facility]\par Cardiovascular & Thoracic Surgery\par Assitant Professor\par Cardiovascular & Thoracic Surgery\par [employer] [employer] [facility] [employer]\par \par [facility] \par [address] [place] [address]\[place] [address]\par [phone] Tel\par [phone] Fax\par  [FreeTextEntry2] : Praneeth Boss MD

## 2020-01-04 NOTE — ASSESSMENT
[FreeTextEntry1] : Ms. Colunga has a persistent cough which at times is productive.  She has a significant history of reflux disease which is likely contributing or the culprit of her cough. I am recommending a manometry study as well as a bronchoscopy to better define the etiology of her cough.  I will also have her obtain a cat scan of the chest to evaluate a reported hiatal hernia.  She will be scheduled in the next two to three weeks for the bronchoscopy and the manometry study.

## 2020-01-07 ENCOUNTER — RX RENEWAL (OUTPATIENT)
Age: 75
End: 2020-01-07

## 2020-01-21 ENCOUNTER — APPOINTMENT (OUTPATIENT)
Dept: CT IMAGING | Facility: CLINIC | Age: 75
End: 2020-01-21
Payer: MEDICARE

## 2020-01-21 PROCEDURE — 71250 CT THORAX DX C-: CPT

## 2020-01-24 ENCOUNTER — APPOINTMENT (OUTPATIENT)
Dept: FAMILY MEDICINE | Facility: CLINIC | Age: 75
End: 2020-01-24
Payer: MEDICARE

## 2020-01-24 VITALS
HEART RATE: 64 BPM | BODY MASS INDEX: 36.12 KG/M2 | TEMPERATURE: 97.6 F | HEIGHT: 60 IN | OXYGEN SATURATION: 97 % | SYSTOLIC BLOOD PRESSURE: 118 MMHG | WEIGHT: 184 LBS | DIASTOLIC BLOOD PRESSURE: 66 MMHG | RESPIRATION RATE: 16 BRPM

## 2020-01-24 DIAGNOSIS — K57.92 DIVERTICULITIS OF INTESTINE, PART UNSPECIFIED, W/OUT PERFORATION OR ABSCESS W/OUT BLEEDING: ICD-10-CM

## 2020-01-24 PROCEDURE — 99214 OFFICE O/P EST MOD 30 MIN: CPT | Mod: 25

## 2020-01-24 NOTE — ASSESSMENT
[FreeTextEntry1] : Recurrence of diverticulitis.  Clear liquid diet Augmentin if symptoms fail to improve go to the emergency room\par \par Referral to colorectal surgeon for possible colon resection

## 2020-01-24 NOTE — HISTORY OF PRESENT ILLNESS
[FreeTextEntry8] : 1 day of left lower quadrant pain with diarrhea and chill.  Feeling a bit better today patient recently recovered from diverticulitis with p.o. Augmentin this is her fifth or sixth episode in the past 3 years\par \par Physical exam no apparent distress abdomen is soft mild left lower quadrant tenderness no rebound

## 2020-01-27 LAB
ALBUMIN SERPL ELPH-MCNC: 4 G/DL
ALP BLD-CCNC: 121 U/L
ALT SERPL-CCNC: 56 U/L
ANION GAP SERPL CALC-SCNC: 12 MMOL/L
AST SERPL-CCNC: 31 U/L
BASOPHILS # BLD AUTO: 0.04 K/UL
BASOPHILS NFR BLD AUTO: 0.6 %
BILIRUB SERPL-MCNC: 0.4 MG/DL
BUN SERPL-MCNC: 15 MG/DL
CALCIUM SERPL-MCNC: 9.7 MG/DL
CHLORIDE SERPL-SCNC: 102 MMOL/L
CO2 SERPL-SCNC: 30 MMOL/L
CREAT SERPL-MCNC: 0.78 MG/DL
CRP SERPL-MCNC: 0.22 MG/DL
EOSINOPHIL # BLD AUTO: 0.18 K/UL
EOSINOPHIL NFR BLD AUTO: 2.7 %
ERYTHROCYTE [SEDIMENTATION RATE] IN BLOOD BY WESTERGREN METHOD: 37 MM/HR
GLUCOSE SERPL-MCNC: 98 MG/DL
HCT VFR BLD CALC: 39.1 %
HGB BLD-MCNC: 12.5 G/DL
IMM GRANULOCYTES NFR BLD AUTO: 0.3 %
LYMPHOCYTES # BLD AUTO: 2.28 K/UL
LYMPHOCYTES NFR BLD AUTO: 34.4 %
MAN DIFF?: NORMAL
MCHC RBC-ENTMCNC: 29.8 PG
MCHC RBC-ENTMCNC: 32 GM/DL
MCV RBC AUTO: 93.1 FL
MONOCYTES # BLD AUTO: 0.59 K/UL
MONOCYTES NFR BLD AUTO: 8.9 %
NEUTROPHILS # BLD AUTO: 3.52 K/UL
NEUTROPHILS NFR BLD AUTO: 53.1 %
PLATELET # BLD AUTO: 179 K/UL
POTASSIUM SERPL-SCNC: 4.1 MMOL/L
PROT SERPL-MCNC: 6.2 G/DL
RBC # BLD: 4.2 M/UL
RBC # FLD: 14.8 %
SODIUM SERPL-SCNC: 143 MMOL/L
WBC # FLD AUTO: 6.63 K/UL

## 2020-02-16 ENCOUNTER — RX RENEWAL (OUTPATIENT)
Age: 75
End: 2020-02-16

## 2020-03-12 ENCOUNTER — APPOINTMENT (OUTPATIENT)
Dept: FAMILY MEDICINE | Facility: CLINIC | Age: 75
End: 2020-03-12
Payer: MEDICARE

## 2020-03-12 VITALS
HEIGHT: 60 IN | OXYGEN SATURATION: 95 % | BODY MASS INDEX: 36.12 KG/M2 | SYSTOLIC BLOOD PRESSURE: 140 MMHG | WEIGHT: 184 LBS | HEART RATE: 55 BPM | DIASTOLIC BLOOD PRESSURE: 70 MMHG

## 2020-03-12 DIAGNOSIS — L29.9 PRURITUS, UNSPECIFIED: ICD-10-CM

## 2020-03-12 DIAGNOSIS — I83.90 ASYMPTOMATIC VARICOSE VEINS OF UNSPECIFIED LOWER EXTREMITY: ICD-10-CM

## 2020-03-12 LAB
BASOPHILS # BLD AUTO: 0.04 K/UL
BASOPHILS NFR BLD AUTO: 0.6 %
EOSINOPHIL # BLD AUTO: 0.14 K/UL
EOSINOPHIL NFR BLD AUTO: 2.2 %
ESTIMATED AVERAGE GLUCOSE: 114 MG/DL
HBA1C MFR BLD HPLC: 5.6 %
HCT VFR BLD CALC: 41.3 %
HGB BLD-MCNC: 13.5 G/DL
IMM GRANULOCYTES NFR BLD AUTO: 0.2 %
LYMPHOCYTES # BLD AUTO: 2.29 K/UL
LYMPHOCYTES NFR BLD AUTO: 35.4 %
MAN DIFF?: NORMAL
MCHC RBC-ENTMCNC: 30.3 PG
MCHC RBC-ENTMCNC: 32.7 GM/DL
MCV RBC AUTO: 92.6 FL
MONOCYTES # BLD AUTO: 0.55 K/UL
MONOCYTES NFR BLD AUTO: 8.5 %
NEUTROPHILS # BLD AUTO: 3.44 K/UL
NEUTROPHILS NFR BLD AUTO: 53.1 %
PLATELET # BLD AUTO: 195 K/UL
RBC # BLD: 4.46 M/UL
RBC # FLD: 14.8 %
TSH SERPL-ACNC: 0.7 UIU/ML
WBC # FLD AUTO: 6.47 K/UL

## 2020-03-12 PROCEDURE — 99214 OFFICE O/P EST MOD 30 MIN: CPT

## 2020-03-12 RX ORDER — AMOXICILLIN AND CLAVULANATE POTASSIUM 875; 125 MG/1; MG/1
875-125 TABLET, COATED ORAL
Qty: 20 | Refills: 1 | Status: DISCONTINUED | COMMUNITY
Start: 2019-10-17 | End: 2020-03-12

## 2020-03-12 RX ORDER — ZOLPIDEM TARTRATE 10 MG/1
10 TABLET, FILM COATED ORAL
Refills: 0 | Status: DISCONTINUED | COMMUNITY
End: 2020-03-12

## 2020-03-12 RX ORDER — PANTOPRAZOLE SODIUM 40 MG/1
40 GRANULE, DELAYED RELEASE ORAL
Refills: 0 | Status: DISCONTINUED | COMMUNITY
End: 2020-03-12

## 2020-03-12 RX ORDER — METOPROLOL SUCCINATE 50 MG/1
50 TABLET, EXTENDED RELEASE ORAL
Refills: 0 | Status: DISCONTINUED | COMMUNITY
End: 2020-03-12

## 2020-03-12 NOTE — PHYSICAL EXAM
[Normal] : normal rate, regular rhythm, normal S1 and S2 and no murmur heard [No Edema] : there was no peripheral edema [de-identified] : Mild to moderate varicose veins bilaterally

## 2020-03-12 NOTE — HISTORY OF PRESENT ILLNESS
[de-identified] : Constant chronic GERD needs omeprazole on daily basis last EGD was 3 years ago will obtain EGD records from Dr. Sagar Sanderson\par \par Varicose veins of both legs would like to have situation corrected referred to Dr. Mendes\par \par Hypothyroidism currently on levothyroxine\par \par Coronary artery disease recently saw cardiology Plavix was discontinued\par \par pre diabetes

## 2020-03-13 ENCOUNTER — MED ADMIN CHARGE (OUTPATIENT)
Age: 75
End: 2020-03-13

## 2020-03-14 LAB
ALBUMIN SERPL ELPH-MCNC: 4.5 G/DL
ALP BLD-CCNC: 117 U/L
ALT SERPL-CCNC: 51 U/L
ANION GAP SERPL CALC-SCNC: 16 MMOL/L
AST SERPL-CCNC: 39 U/L
BILIRUB SERPL-MCNC: 0.6 MG/DL
BUN SERPL-MCNC: 14 MG/DL
CALCIUM SERPL-MCNC: 9.8 MG/DL
CHLORIDE SERPL-SCNC: 103 MMOL/L
CO2 SERPL-SCNC: 26 MMOL/L
CREAT SERPL-MCNC: 0.71 MG/DL
GLUCOSE SERPL-MCNC: 110 MG/DL
POTASSIUM SERPL-SCNC: 4.2 MMOL/L
PROT SERPL-MCNC: 6.9 G/DL
SODIUM SERPL-SCNC: 145 MMOL/L

## 2020-06-15 ENCOUNTER — APPOINTMENT (OUTPATIENT)
Dept: OBGYN | Facility: CLINIC | Age: 75
End: 2020-06-15
Payer: MEDICARE

## 2020-06-15 VITALS
HEIGHT: 60 IN | SYSTOLIC BLOOD PRESSURE: 116 MMHG | DIASTOLIC BLOOD PRESSURE: 72 MMHG | BODY MASS INDEX: 35.93 KG/M2 | WEIGHT: 183 LBS

## 2020-06-15 DIAGNOSIS — Z01.419 ENCOUNTER FOR GYNECOLOGICAL EXAMINATION (GENERAL) (ROUTINE) W/OUT ABNORMAL FINDINGS: ICD-10-CM

## 2020-06-15 DIAGNOSIS — Z82.49 FAMILY HISTORY OF ISCHEMIC HEART DISEASE AND OTHER DISEASES OF THE CIRCULATORY SYSTEM: ICD-10-CM

## 2020-06-15 DIAGNOSIS — N81.9 FEMALE GENITAL PROLAPSE, UNSPECIFIED: ICD-10-CM

## 2020-06-15 DIAGNOSIS — Z12.11 ENCOUNTER FOR SCREENING FOR MALIGNANT NEOPLASM OF COLON: ICD-10-CM

## 2020-06-15 DIAGNOSIS — Z87.898 PERSONAL HISTORY OF OTHER SPECIFIED CONDITIONS: ICD-10-CM

## 2020-06-15 DIAGNOSIS — Z12.39 ENCOUNTER FOR OTHER SCREENING FOR MALIGNANT NEOPLASM OF BREAST: ICD-10-CM

## 2020-06-15 DIAGNOSIS — Z82.0 FAMILY HISTORY OF EPILEPSY AND OTHER DISEASES OF THE NERVOUS SYSTEM: ICD-10-CM

## 2020-06-15 DIAGNOSIS — Z12.4 ENCOUNTER FOR SCREENING FOR MALIGNANT NEOPLASM OF CERVIX: ICD-10-CM

## 2020-06-15 LAB
BILIRUB UR QL STRIP: NORMAL
DATE COLLECTED: NORMAL
GLUCOSE UR-MCNC: NORMAL
HCG UR QL: 0.2 EU/DL
HEMOCCULT SP1 STL QL: NEGATIVE
HGB UR QL STRIP.AUTO: NORMAL
KETONES UR-MCNC: NORMAL
LEUKOCYTE ESTERASE UR QL STRIP: NORMAL
NITRITE UR QL STRIP: NORMAL
PH UR STRIP: 7.5
PROT UR STRIP-MCNC: NORMAL
QUALITY CONTROL: YES
SP GR UR STRIP: 1.01

## 2020-06-15 PROCEDURE — 82270 OCCULT BLOOD FECES: CPT

## 2020-06-15 PROCEDURE — G0101: CPT | Mod: GA

## 2020-06-15 PROCEDURE — 81003 URINALYSIS AUTO W/O SCOPE: CPT | Mod: QW

## 2020-06-15 PROCEDURE — 99397 PER PM REEVAL EST PAT 65+ YR: CPT | Mod: GY

## 2020-06-15 NOTE — HISTORY OF PRESENT ILLNESS
[Last Mammogram ___] : Last Mammogram was [unfilled] [Last Colonoscopy ___] : Last colonoscopy [unfilled] [Last Bone Density ___] : Last bone density studies [unfilled] [Last Pap ___] : Last cervical pap smear was [unfilled] [Postmenopausal] : is postmenopausal [unknown] : the patient is unsure of the date of her LMP [Post-Menopause, No Sxs] : post-menopausal, currently without symptoms [Menarche Age: ____] : age at menarche was [unfilled] [NA] : N/A [___ Year(s) Ago] : [unfilled] year(s) ago [de-identified] : Pelvic US 12/15/2015 [Sexually Active] : is not sexually active

## 2020-06-15 NOTE — PHYSICAL EXAM
[Awake] : awake [Alert] : alert [Soft] : soft [Oriented x3] : oriented to person, place, and time [Normal] : vagina [No Bleeding] : there was no active vaginal bleeding [Absent] : absent [No Tenderness] : no rectal tenderness [Uterine Adnexae] : were not tender and not enlarged [Acute Distress] : no acute distress [Mass] : no breast mass [Nipple Discharge] : no nipple discharge [Tender] : non tender [Axillary LAD] : no axillary lymphadenopathy [Occult Blood] : occult blood test from digital rectal exam was negative

## 2020-06-17 LAB — CYTOLOGY CVX/VAG DOC THIN PREP: ABNORMAL

## 2020-07-07 ENCOUNTER — APPOINTMENT (OUTPATIENT)
Dept: MAMMOGRAPHY | Facility: CLINIC | Age: 75
End: 2020-07-07
Payer: MEDICARE

## 2020-07-07 ENCOUNTER — RESULT REVIEW (OUTPATIENT)
Age: 75
End: 2020-07-07

## 2020-07-07 PROCEDURE — 77067 SCR MAMMO BI INCL CAD: CPT

## 2020-07-07 PROCEDURE — 77063 BREAST TOMOSYNTHESIS BI: CPT

## 2020-12-21 PROBLEM — Z87.09 HISTORY OF ACUTE SINUSITIS: Status: RESOLVED | Noted: 2019-09-12 | Resolved: 2020-12-21

## 2020-12-21 PROBLEM — Z01.419 ENCOUNTER FOR GYNECOLOGICAL EXAMINATION: Status: RESOLVED | Noted: 2019-04-17 | Resolved: 2020-12-21

## 2020-12-23 PROBLEM — Z01.419 ENCOUNTER FOR ANNUAL ROUTINE GYNECOLOGICAL EXAMINATION: Status: RESOLVED | Noted: 2020-06-15 | Resolved: 2020-12-23

## 2021-02-10 ENCOUNTER — RX RENEWAL (OUTPATIENT)
Age: 76
End: 2021-02-10

## 2021-02-16 ENCOUNTER — RX RENEWAL (OUTPATIENT)
Age: 76
End: 2021-02-16

## 2021-04-09 ENCOUNTER — RX RENEWAL (OUTPATIENT)
Age: 76
End: 2021-04-09

## 2021-04-14 ENCOUNTER — RX RENEWAL (OUTPATIENT)
Age: 76
End: 2021-04-14

## 2021-04-14 ENCOUNTER — NON-APPOINTMENT (OUTPATIENT)
Age: 76
End: 2021-04-14

## 2021-04-21 ENCOUNTER — APPOINTMENT (OUTPATIENT)
Dept: FAMILY MEDICINE | Facility: CLINIC | Age: 76
End: 2021-04-21
Payer: MEDICARE

## 2021-04-21 VITALS
BODY MASS INDEX: 36.32 KG/M2 | WEIGHT: 185 LBS | HEIGHT: 60 IN | DIASTOLIC BLOOD PRESSURE: 84 MMHG | TEMPERATURE: 97.7 F | HEART RATE: 63 BPM | SYSTOLIC BLOOD PRESSURE: 136 MMHG | OXYGEN SATURATION: 98 %

## 2021-04-21 DIAGNOSIS — R73.03 PREDIABETES.: ICD-10-CM

## 2021-04-21 DIAGNOSIS — R05 COUGH: ICD-10-CM

## 2021-04-21 PROCEDURE — G0439: CPT

## 2021-04-21 PROCEDURE — 99213 OFFICE O/P EST LOW 20 MIN: CPT | Mod: 25

## 2021-04-21 PROCEDURE — G0444 DEPRESSION SCREEN ANNUAL: CPT | Mod: 59

## 2021-04-21 NOTE — HEALTH RISK ASSESSMENT
[Good] : ~his/her~ current health as good [Fair] :  ~his/her~ mood as fair [No] : No [] : No [No falls in past year] : Patient reported no falls in the past year [0] : 2) Feeling down, depressed, or hopeless: Not at all (0) [Patient reported mammogram was normal] : Patient reported mammogram was normal [Change in mental status noted] : No change in mental status noted [With Family] : lives with family [] :  [Fully functional (bathing, dressing, toileting, transferring, walking, feeding)] : Fully functional (bathing, dressing, toileting, transferring, walking, feeding) [Fully functional (using the telephone, shopping, preparing meals, housekeeping, doing laundry, using] : Fully functional and needs no help or supervision to perform IADLs (using the telephone, shopping, preparing meals, housekeeping, doing laundry, using transportation, managing medications and managing finances) [Seat Belt] :  uses seat belt [MammogramDate] : 04/20 [ColonoscopyComments] : Cologuard to be performed

## 2021-04-21 NOTE — ASSESSMENT
[FreeTextEntry1] : Follow-up with bariatric surgeon for GERD and morbid obesity\par \par Check TSH and T4.  Levothyroxine dose reduced from 100 to 88 ` recheck in 2 months\par \par Add Astelin and fluticasone for chronic rhinitis and nocturnal cough renew Hycodan\par \par Bone density ordered

## 2021-04-21 NOTE — HISTORY OF PRESENT ILLNESS
[FreeTextEntry1] : Yearly physical\par  [de-identified] : Was hearing heartbeat in left ear saw cardiologist TSH was low levothyroxine decreased from 100 to 88 mcg and symptom has abated\par \par Chronic GERD on pantoprazole 40 mg daily sometimes has breakthrough heartburn has appointment with Dr. Cast for repair of hiatal hernia and possible gastric sleeve for obesity.  Patient told she may increase pantoprazole to 80 mg for brief periods of time\par \par Chronic postnasal drip causing chronic nocturnal cough only cough medicine that seems to work is Hycodan which she takes nightly cough is caused by postnasal drip taking over-the-counter allergy medicine will add Astelin and continue fluticasone nasal sprays\par \par Insomnia using zolpidem intermittently\par \par Increased stress  diagnosed with Parkinson's he is fallen multiple times losing the ability to walk having hallucinations.  He has [employer] benefits he could get home care but he refuses

## 2021-04-21 NOTE — PHYSICAL EXAM
[Normal] : normal rate, regular rhythm, normal S1 and S2 and no murmur heard [de-identified] : Brawny edema [de-identified] : Obese

## 2021-04-22 LAB
ESTIMATED AVERAGE GLUCOSE: 123 MG/DL
HBA1C MFR BLD HPLC: 5.9 %
T4 FREE SERPL-MCNC: 1.8 NG/DL
TSH SERPL-ACNC: 0.69 UIU/ML

## 2021-05-03 ENCOUNTER — APPOINTMENT (OUTPATIENT)
Dept: RADIOLOGY | Facility: CLINIC | Age: 76
End: 2021-05-03
Payer: MEDICARE

## 2021-05-03 PROCEDURE — 77080 DXA BONE DENSITY AXIAL: CPT

## 2021-06-21 ENCOUNTER — RESULT REVIEW (OUTPATIENT)
Age: 76
End: 2021-06-21

## 2021-06-28 ENCOUNTER — LABORATORY RESULT (OUTPATIENT)
Age: 76
End: 2021-06-28

## 2021-06-28 ENCOUNTER — APPOINTMENT (OUTPATIENT)
Dept: FAMILY MEDICINE | Facility: CLINIC | Age: 76
End: 2021-06-28
Payer: MEDICARE

## 2021-06-28 VITALS
TEMPERATURE: 97.3 F | SYSTOLIC BLOOD PRESSURE: 100 MMHG | HEART RATE: 58 BPM | OXYGEN SATURATION: 97 % | WEIGHT: 189 LBS | HEIGHT: 60 IN | DIASTOLIC BLOOD PRESSURE: 80 MMHG | RESPIRATION RATE: 16 BRPM | BODY MASS INDEX: 37.11 KG/M2

## 2021-06-28 DIAGNOSIS — E66.9 OBESITY, UNSPECIFIED: ICD-10-CM

## 2021-06-28 PROCEDURE — 99214 OFFICE O/P EST MOD 30 MIN: CPT

## 2021-06-28 NOTE — HISTORY OF PRESENT ILLNESS
[Coronary Artery Disease] : coronary artery disease [No Pertinent Pulmonary History] : no history of asthma, COPD, sleep apnea, or smoking [No Adverse Anesthesia Reaction] : no adverse anesthesia reaction in self or family member [Chronic Anticoagulation] : no chronic anticoagulation [Chronic Kidney Disease] : no chronic kidney disease [Diabetes] : no diabetes [(Patient denies any chest pain, claudication, dyspnea on exertion, orthopnea, palpitations or syncope)] : Patient denies any chest pain, claudication, dyspnea on exertion, orthopnea, palpitations or syncope [FreeTextEntry1] : Gastric sleeve [FreeTextEntry2] : August 27 [FreeTextEntry3] : Dr Banks [FreeTextEntry4] : Patient has suffered from morbid obesity almost all her life she also suffers from hiatal hernia has gastric sleeve planned in the near future.\par \par No cardiovascular symptoms with exertion she has been cleared by cardiology\par \par Her hypothyroidism has been well controlled on levothyroxine 88 mcg daily\par \par She occasionally uses Hycodan for an upper airway cough due to allergies\par \par Hypertension well-controlled on current meds [FreeTextEntry5] : Patient had CABG in 1994 cardiac stenting 2003.  Currently no ischemic heart disease symptoms

## 2021-06-29 LAB
ALBUMIN SERPL ELPH-MCNC: 4.4 G/DL
ANION GAP SERPL CALC-SCNC: 10 MMOL/L
BUN SERPL-MCNC: 14 MG/DL
CALCIUM SERPL-MCNC: 10 MG/DL
CHLORIDE SERPL-SCNC: 103 MMOL/L
CO2 SERPL-SCNC: 29 MMOL/L
CREAT SERPL-MCNC: 0.87 MG/DL
ESTIMATED AVERAGE GLUCOSE: 126 MG/DL
GLUCOSE SERPL-MCNC: 106 MG/DL
HBA1C MFR BLD HPLC: 6 %
PHOSPHATE SERPL-MCNC: 3.5 MG/DL
POTASSIUM SERPL-SCNC: 4.4 MMOL/L
SODIUM SERPL-SCNC: 142 MMOL/L
TSH SERPL-ACNC: 8.9 UIU/ML

## 2021-07-14 ENCOUNTER — RESULT REVIEW (OUTPATIENT)
Age: 76
End: 2021-07-14

## 2021-07-16 ENCOUNTER — NON-APPOINTMENT (OUTPATIENT)
Age: 76
End: 2021-07-16

## 2021-07-21 ENCOUNTER — APPOINTMENT (OUTPATIENT)
Dept: FAMILY MEDICINE | Facility: CLINIC | Age: 76
End: 2021-07-21

## 2021-07-22 ENCOUNTER — NON-APPOINTMENT (OUTPATIENT)
Age: 76
End: 2021-07-22

## 2021-07-23 ENCOUNTER — APPOINTMENT (OUTPATIENT)
Dept: FAMILY MEDICINE | Facility: CLINIC | Age: 76
End: 2021-07-23
Payer: MEDICARE

## 2021-07-23 VITALS
BODY MASS INDEX: 34.75 KG/M2 | SYSTOLIC BLOOD PRESSURE: 130 MMHG | WEIGHT: 177 LBS | DIASTOLIC BLOOD PRESSURE: 80 MMHG | OXYGEN SATURATION: 98 % | HEIGHT: 60 IN | HEART RATE: 63 BPM | TEMPERATURE: 97 F

## 2021-07-23 DIAGNOSIS — E87.70 FLUID OVERLOAD, UNSPECIFIED: ICD-10-CM

## 2021-07-23 PROCEDURE — 99496 TRANSJ CARE MGMT HIGH F2F 7D: CPT

## 2021-07-23 RX ORDER — TRAMADOL HYDROCHLORIDE AND ACETAMINOPHEN 37.5; 325 MG/1; MG/1
37.5-325 TABLET, FILM COATED ORAL
Qty: 15 | Refills: 0 | Status: COMPLETED | COMMUNITY
Start: 2021-07-15

## 2021-07-23 RX ORDER — HYDROCODONE BITARTRATE AND HOMATROPINE METHYLBROMIDE 5; 1.5 MG/5ML; MG/5ML
5-1.5 SYRUP ORAL
Qty: 240 | Refills: 0 | Status: DISCONTINUED | COMMUNITY
Start: 2019-06-13 | End: 2021-07-23

## 2021-07-23 RX ORDER — CEFADROXIL 500 MG/1
500 CAPSULE ORAL
Qty: 14 | Refills: 0 | Status: COMPLETED | COMMUNITY
Start: 2021-02-25

## 2021-07-23 RX ORDER — METOPROLOL SUCCINATE 50 MG/1
50 TABLET, EXTENDED RELEASE ORAL
Refills: 0 | Status: COMPLETED | COMMUNITY

## 2021-07-23 RX ORDER — FUROSEMIDE 20 MG/1
20 TABLET ORAL
Refills: 0 | Status: COMPLETED | COMMUNITY

## 2021-07-23 RX ORDER — OMEPRAZOLE 40 MG/1
40 CAPSULE, DELAYED RELEASE ORAL
Refills: 0 | Status: COMPLETED | COMMUNITY

## 2021-07-23 RX ORDER — GENTAMICIN SULFATE 1 MG/G
0.1 CREAM TOPICAL
Qty: 15 | Refills: 0 | Status: COMPLETED | COMMUNITY
Start: 2021-02-25

## 2021-07-23 RX ORDER — GENTAMICIN SULFATE 3 MG/ML
0.3 SOLUTION OPHTHALMIC
Qty: 1 | Refills: 3 | Status: DISCONTINUED | COMMUNITY
Start: 2019-10-17 | End: 2021-07-23

## 2021-07-23 RX ORDER — ENOXAPARIN SODIUM 100 MG/ML
40 INJECTION SUBCUTANEOUS
Qty: 8 | Refills: 0 | Status: COMPLETED | COMMUNITY
Start: 2021-07-15

## 2021-07-23 RX ORDER — ENOXAPARIN SODIUM 40 MG/.4ML
40 INJECTION SUBCUTANEOUS
Refills: 0 | Status: COMPLETED | COMMUNITY

## 2021-07-23 RX ORDER — HYDROCORTISONE 1 %
12 CREAM (GRAM) TOPICAL
Qty: 400 | Refills: 0 | Status: COMPLETED | COMMUNITY
Start: 2021-03-11

## 2021-07-23 NOTE — ASSESSMENT
[FreeTextEntry1] : To new current meds see me in 1 month\par \par Send blood test results to Dr. Cast  [phone]

## 2021-07-23 NOTE — HISTORY OF PRESENT ILLNESS
[de-identified] : Gastric sleeve and hernia repair was performed 9 days ago.  Patient was readmitted 4 days later with fluid overload.  Was a bump in her troponins echocardiogram was unremarkable chest x-ray showed some CHF ankles were swollen patient was started on Lasix hydrochlorothiazide discontinued she also had hypomagnesemia.  Breathing is now back to normal no chest pain gastric sleeve has been successful\par \par Patient will resume her lisinopril hydrochlorothiazide.  She is on magnesium supplement.  She will continue with Lovenox subcu 40 mg daily for 1 month

## 2021-07-24 LAB
ALBUMIN SERPL ELPH-MCNC: 4 G/DL
ANION GAP SERPL CALC-SCNC: 16 MMOL/L
BUN SERPL-MCNC: 24 MG/DL
CALCIUM SERPL-MCNC: 9.7 MG/DL
CHLORIDE SERPL-SCNC: 99 MMOL/L
CO2 SERPL-SCNC: 25 MMOL/L
CREAT SERPL-MCNC: 0.91 MG/DL
GLUCOSE SERPL-MCNC: 76 MG/DL
MAGNESIUM SERPL-MCNC: 1.9 MG/DL
PHOSPHATE SERPL-MCNC: 3.5 MG/DL
POTASSIUM SERPL-SCNC: 4.8 MMOL/L
SODIUM SERPL-SCNC: 140 MMOL/L

## 2021-07-26 ENCOUNTER — APPOINTMENT (OUTPATIENT)
Dept: MAMMOGRAPHY | Facility: CLINIC | Age: 76
End: 2021-07-26

## 2021-08-30 ENCOUNTER — APPOINTMENT (OUTPATIENT)
Dept: FAMILY MEDICINE | Facility: CLINIC | Age: 76
End: 2021-08-30
Payer: MEDICARE

## 2021-08-30 VITALS
OXYGEN SATURATION: 96 % | WEIGHT: 165.5 LBS | TEMPERATURE: 97.5 F | HEART RATE: 65 BPM | DIASTOLIC BLOOD PRESSURE: 60 MMHG | BODY MASS INDEX: 33.36 KG/M2 | HEIGHT: 59 IN | SYSTOLIC BLOOD PRESSURE: 118 MMHG

## 2021-08-30 DIAGNOSIS — E83.42 HYPOMAGNESEMIA: ICD-10-CM

## 2021-08-30 PROCEDURE — 99214 OFFICE O/P EST MOD 30 MIN: CPT | Mod: 25

## 2021-08-30 RX ORDER — THIAMINE HCL 50 MG
TABLET ORAL
Refills: 0 | Status: COMPLETED | COMMUNITY

## 2021-08-30 RX ORDER — LISINOPRIL AND HYDROCHLOROTHIAZIDE TABLETS 20; 12.5 MG/1; MG/1
20-12.5 TABLET ORAL
Qty: 90 | Refills: 3 | Status: DISCONTINUED | COMMUNITY
Start: 2019-05-13 | End: 2021-08-30

## 2021-08-30 RX ORDER — LISINOPRIL 20 MG/1
20 TABLET ORAL
Qty: 30 | Refills: 0 | Status: COMPLETED | COMMUNITY
Start: 2021-07-20

## 2021-08-30 RX ORDER — MAGNESIUM OXIDE 241.3 MG/1000MG
400 TABLET ORAL
Qty: 30 | Refills: 0 | Status: COMPLETED | COMMUNITY
Start: 2021-07-20

## 2021-08-30 RX ORDER — AZELASTINE HYDROCHLORIDE 137 UG/1
137 SPRAY, METERED NASAL TWICE DAILY
Qty: 1 | Refills: 5 | Status: DISCONTINUED | COMMUNITY
Start: 2021-04-21 | End: 2021-08-30

## 2021-08-30 RX ORDER — LOPERAMIDE HYDROCHLORIDE 2 MG/1
2 CAPSULE ORAL
Qty: 21 | Refills: 0 | Status: COMPLETED | COMMUNITY
Start: 2021-07-20

## 2021-08-30 NOTE — ASSESSMENT
[FreeTextEntry1] : Slowly improving continue current meds send results to Dr. Garner phone number in FYI

## 2021-08-30 NOTE — HISTORY OF PRESENT ILLNESS
[de-identified] : Status post gastric sleeve has persistent nausea relieved with Zofran following up with surgeon who attributes some of the symptoms to her hiatal hernia repair.  She is slowly losing weight.  Continues to have constipation encouraged to take Metamucil daily along with Colace as needed\par \par Fluid overload has resolved cardiology decreased lisinopril from 20 12.5-10 12.5

## 2021-08-31 LAB
ALBUMIN SERPL ELPH-MCNC: 4.2 G/DL
ALP BLD-CCNC: 63 U/L
ALT SERPL-CCNC: 33 U/L
ANION GAP SERPL CALC-SCNC: 15 MMOL/L
AST SERPL-CCNC: 33 U/L
BASOPHILS # BLD AUTO: 0.03 K/UL
BASOPHILS NFR BLD AUTO: 0.5 %
BILIRUB SERPL-MCNC: 0.6 MG/DL
BUN SERPL-MCNC: 26 MG/DL
CALCIUM SERPL-MCNC: 9.5 MG/DL
CHLORIDE SERPL-SCNC: 98 MMOL/L
CHOLEST SERPL-MCNC: 137 MG/DL
CO2 SERPL-SCNC: 29 MMOL/L
CREAT SERPL-MCNC: 1.28 MG/DL
EOSINOPHIL # BLD AUTO: 0.08 K/UL
EOSINOPHIL NFR BLD AUTO: 1.4 %
ESTIMATED AVERAGE GLUCOSE: 117 MG/DL
GLUCOSE SERPL-MCNC: 94 MG/DL
HBA1C MFR BLD HPLC: 5.7 %
HCT VFR BLD CALC: 41.4 %
HDLC SERPL-MCNC: 57 MG/DL
HGB BLD-MCNC: 13.2 G/DL
IMM GRANULOCYTES NFR BLD AUTO: 0.2 %
LDLC SERPL CALC-MCNC: 56 MG/DL
LYMPHOCYTES # BLD AUTO: 1.99 K/UL
LYMPHOCYTES NFR BLD AUTO: 34.9 %
MAGNESIUM SERPL-MCNC: 2.1 MG/DL
MAN DIFF?: NORMAL
MCHC RBC-ENTMCNC: 30.4 PG
MCHC RBC-ENTMCNC: 31.9 GM/DL
MCV RBC AUTO: 95.4 FL
MONOCYTES # BLD AUTO: 0.6 K/UL
MONOCYTES NFR BLD AUTO: 10.5 %
NEUTROPHILS # BLD AUTO: 3 K/UL
NEUTROPHILS NFR BLD AUTO: 52.5 %
NONHDLC SERPL-MCNC: 80 MG/DL
PLATELET # BLD AUTO: 179 K/UL
POTASSIUM SERPL-SCNC: 4.6 MMOL/L
PROT SERPL-MCNC: 6.3 G/DL
RBC # BLD: 4.34 M/UL
RBC # FLD: 17.4 %
SODIUM SERPL-SCNC: 142 MMOL/L
TRIGL SERPL-MCNC: 118 MG/DL
WBC # FLD AUTO: 5.71 K/UL

## 2021-09-20 LAB
APPEARANCE: CLEAR
BACTERIA: NEGATIVE
BILIRUBIN URINE: NEGATIVE
BLOOD URINE: NEGATIVE
COLOR: YELLOW
GLUCOSE QUALITATIVE U: NORMAL MG/DL
HYALINE CASTS: 0 /LPF
KETONES URINE: NEGATIVE
LEUKOCYTE ESTERASE URINE: NEGATIVE
MICROSCOPIC-UA: NORMAL
NITRITE URINE: NEGATIVE
PH URINE: 6.5
PROTEIN URINE: NEGATIVE MG/DL
RED BLOOD CELLS URINE: 0 /HPF
SPECIFIC GRAVITY URINE: 1.01
SQUAMOUS EPITHELIAL CELLS: 1 /HPF
UROBILINOGEN URINE: NORMAL MG/DL
WHITE BLOOD CELLS URINE: 1 /HPF

## 2021-10-18 ENCOUNTER — APPOINTMENT (OUTPATIENT)
Dept: MAMMOGRAPHY | Facility: CLINIC | Age: 76
End: 2021-10-18
Payer: MEDICARE

## 2021-10-18 ENCOUNTER — RESULT REVIEW (OUTPATIENT)
Age: 76
End: 2021-10-18

## 2021-10-18 PROCEDURE — 77063 BREAST TOMOSYNTHESIS BI: CPT

## 2021-10-18 PROCEDURE — 77067 SCR MAMMO BI INCL CAD: CPT

## 2021-11-19 ENCOUNTER — APPOINTMENT (OUTPATIENT)
Dept: FAMILY MEDICINE | Facility: CLINIC | Age: 76
End: 2021-11-19
Payer: MEDICARE

## 2021-11-19 VITALS
HEIGHT: 59 IN | OXYGEN SATURATION: 98 % | WEIGHT: 153 LBS | BODY MASS INDEX: 30.84 KG/M2 | DIASTOLIC BLOOD PRESSURE: 72 MMHG | HEART RATE: 65 BPM | SYSTOLIC BLOOD PRESSURE: 120 MMHG | TEMPERATURE: 97.3 F

## 2021-11-19 DIAGNOSIS — M54.6 PAIN IN THORACIC SPINE: ICD-10-CM

## 2021-11-19 DIAGNOSIS — G89.29 PAIN IN THORACIC SPINE: ICD-10-CM

## 2021-11-19 DIAGNOSIS — Z87.09 PERSONAL HISTORY OF OTHER DISEASES OF THE RESPIRATORY SYSTEM: ICD-10-CM

## 2021-11-19 PROCEDURE — 99214 OFFICE O/P EST MOD 30 MIN: CPT

## 2021-11-19 RX ORDER — LISINOPRIL AND HYDROCHLOROTHIAZIDE TABLETS 10; 12.5 MG/1; MG/1
10-12.5 TABLET ORAL
Qty: 60 | Refills: 0 | Status: DISCONTINUED | COMMUNITY
Start: 2021-07-27 | End: 2021-11-19

## 2021-11-19 RX ORDER — CHLORHEXIDINE GLUCONATE 4 %
400 (240 MG) LIQUID (ML) TOPICAL
Qty: 1 | Refills: 5 | Status: DISCONTINUED | COMMUNITY
Start: 2021-07-23 | End: 2021-11-19

## 2021-11-19 NOTE — HISTORY OF PRESENT ILLNESS
[FreeTextEntry1] : Status post gastric bypass status post cardiac stents [de-identified] : Patient continues to lose weight post gastric bypass her only complaint is of lower esophageal discomfort patient had repair of large hiatal hernia along with gastric sleeve.  She is on PPI daily and uses Tums occasionally.  Told to discontinue Tums and use liquid antacid not together with PPI\par \par Recent extensive blood work performed by Dr. Banks within normal limits\par \par Recently saw cardiology who decreased lisinopril to 5 mg daily and eliminated hydrochlorothiazide\par \par Chronic mid back pain with worsening kyphosis x-ray has shown DJD recent bone density normal pain is generalized along mid spine\par \par Referral to physical therapy

## 2021-11-19 NOTE — PHYSICAL EXAM
[Normal] : normal rate, regular rhythm, normal S1 and S2 and no murmur heard [No Edema] : there was no peripheral edema [de-identified] : Mild kyphosis of thoracic area with decreased range of motion

## 2021-11-19 NOTE — ASSESSMENT
[FreeTextEntry1] : Physical therapy referral for kyphosis and mid back pain\par \par Continue current medications follow-up in 6 months

## 2022-01-17 ENCOUNTER — RX RENEWAL (OUTPATIENT)
Age: 77
End: 2022-01-17

## 2022-03-02 ENCOUNTER — APPOINTMENT (OUTPATIENT)
Dept: FAMILY MEDICINE | Facility: CLINIC | Age: 77
End: 2022-03-02
Payer: MEDICARE

## 2022-03-02 VITALS
OXYGEN SATURATION: 97 % | DIASTOLIC BLOOD PRESSURE: 76 MMHG | HEIGHT: 59 IN | BODY MASS INDEX: 28.63 KG/M2 | TEMPERATURE: 98 F | SYSTOLIC BLOOD PRESSURE: 130 MMHG | WEIGHT: 142 LBS | HEART RATE: 57 BPM

## 2022-03-02 DIAGNOSIS — M94.0 CHONDROCOSTAL JUNCTION SYNDROME [TIETZE]: ICD-10-CM

## 2022-03-02 PROCEDURE — G0444 DEPRESSION SCREEN ANNUAL: CPT | Mod: 59

## 2022-03-02 PROCEDURE — 99214 OFFICE O/P EST MOD 30 MIN: CPT | Mod: 25

## 2022-03-02 RX ORDER — MULTIVITAMIN
TABLET ORAL
Refills: 0 | Status: ACTIVE | COMMUNITY

## 2022-03-02 NOTE — REVIEW OF SYSTEMS
[Chest Pain] : chest pain [Lower Ext Edema] : no lower extremity edema [Negative] : Genitourinary [FreeTextEntry5] : Costochondritis

## 2022-03-02 NOTE — ASSESSMENT
[FreeTextEntry1] : Costochondritis patient may use Advil dual  ice stretch\par \par Check thyroid function

## 2022-03-02 NOTE — PHYSICAL EXAM
[Normal] : normal rate, regular rhythm, normal S1 and S2 and no murmur heard [No Edema] : there was no peripheral edema [No Rash] : no rash [de-identified] : Left midsternal tenderness radiating around to pectoral area

## 2022-03-02 NOTE — HISTORY OF PRESENT ILLNESS
[FreeTextEntry1] : Left-sided chest pain [de-identified] : Patient has left-sided sternal tenderness which reproduces pain also reproduced with stretching patient has been lifting her  who recently had ventriculoperitoneal shunt for normal pressure hydrocephalus\par \par GERD is under control with pantoprazole every other day\par \par Recent T4 was elevated on levothyroxine 88 mcg will check thyroid function\par \par Low albumin on recent profile patient has initiated protein shakes we will recheck

## 2022-03-03 LAB
ALBUMIN SERPL ELPH-MCNC: 4.3 G/DL
ALP BLD-CCNC: 118 U/L
ALT SERPL-CCNC: 40 U/L
ANION GAP SERPL CALC-SCNC: 11 MMOL/L
AST SERPL-CCNC: 30 U/L
BILIRUB SERPL-MCNC: 0.4 MG/DL
BUN SERPL-MCNC: 19 MG/DL
CALCIUM SERPL-MCNC: 9.9 MG/DL
CHLORIDE SERPL-SCNC: 105 MMOL/L
CO2 SERPL-SCNC: 27 MMOL/L
CREAT SERPL-MCNC: 0.92 MG/DL
EGFR: 65 ML/MIN/1.73M2
GLUCOSE SERPL-MCNC: 96 MG/DL
POTASSIUM SERPL-SCNC: 4.6 MMOL/L
PROT SERPL-MCNC: 6.4 G/DL
SODIUM SERPL-SCNC: 143 MMOL/L
TSH SERPL-ACNC: 2.27 UIU/ML

## 2022-03-21 ENCOUNTER — RESULT REVIEW (OUTPATIENT)
Age: 77
End: 2022-03-21

## 2022-04-04 ENCOUNTER — OUTPATIENT (OUTPATIENT)
Dept: OUTPATIENT SERVICES | Facility: HOSPITAL | Age: 77
LOS: 1 days | End: 2022-04-04
Payer: MEDICARE

## 2022-04-04 DIAGNOSIS — K21.9 GASTRO-ESOPHAGEAL REFLUX DISEASE WITHOUT ESOPHAGITIS: ICD-10-CM

## 2022-04-04 DIAGNOSIS — Z98.84 BARIATRIC SURGERY STATUS: ICD-10-CM

## 2022-04-04 PROCEDURE — 74240 X-RAY XM UPR GI TRC 1CNTRST: CPT | Mod: 26

## 2022-04-29 ENCOUNTER — RX RENEWAL (OUTPATIENT)
Age: 77
End: 2022-04-29

## 2022-05-02 ENCOUNTER — RX RENEWAL (OUTPATIENT)
Age: 77
End: 2022-05-02

## 2022-05-03 ENCOUNTER — RX RENEWAL (OUTPATIENT)
Age: 77
End: 2022-05-03

## 2022-05-23 ENCOUNTER — RX RENEWAL (OUTPATIENT)
Age: 77
End: 2022-05-23

## 2022-06-29 ENCOUNTER — APPOINTMENT (OUTPATIENT)
Dept: FAMILY MEDICINE | Facility: CLINIC | Age: 77
End: 2022-06-29

## 2022-06-29 VITALS
HEIGHT: 59 IN | TEMPERATURE: 97.2 F | SYSTOLIC BLOOD PRESSURE: 132 MMHG | DIASTOLIC BLOOD PRESSURE: 70 MMHG | HEART RATE: 60 BPM | BODY MASS INDEX: 28.63 KG/M2 | WEIGHT: 142 LBS | OXYGEN SATURATION: 97 %

## 2022-06-29 PROCEDURE — 99214 OFFICE O/P EST MOD 30 MIN: CPT

## 2022-06-29 RX ORDER — LISINOPRIL 10 MG/1
10 TABLET ORAL DAILY
Qty: 90 | Refills: 3 | Status: ACTIVE | COMMUNITY

## 2022-06-29 RX ORDER — PANTOPRAZOLE 40 MG/1
40 TABLET, DELAYED RELEASE ORAL
Qty: 90 | Refills: 3 | Status: DISCONTINUED | COMMUNITY
Start: 2019-06-18 | End: 2022-06-29

## 2022-06-29 RX ORDER — METOPROLOL SUCCINATE 25 MG/1
25 TABLET, EXTENDED RELEASE ORAL DAILY
Qty: 1 | Refills: 3 | Status: ACTIVE | COMMUNITY
Start: 2017-12-12

## 2022-06-29 RX ORDER — METOPROLOL SUCCINATE 25 MG/1
25 TABLET, EXTENDED RELEASE ORAL
Qty: 60 | Refills: 0 | Status: COMPLETED | COMMUNITY
Start: 2022-06-16

## 2022-06-29 NOTE — HISTORY OF PRESENT ILLNESS
[de-identified] : Recently saw bariatric surgeon hiatal hernia is recurring patient will switch to omeprazole 40 mg continues to have indigestion type symptoms we will add Carafate\par \par Recently saw cardiologist metoprolol was decreased to 25 mg Zetia added to her statin blood pressure in the right arm is 152/68 will increase lisinopril pro back up to 10 mg\par \par Requests refill of Ambien

## 2022-10-06 ENCOUNTER — LABORATORY RESULT (OUTPATIENT)
Age: 77
End: 2022-10-06

## 2022-10-06 ENCOUNTER — TRANSCRIPTION ENCOUNTER (OUTPATIENT)
Age: 77
End: 2022-10-06

## 2022-10-06 ENCOUNTER — APPOINTMENT (OUTPATIENT)
Dept: FAMILY MEDICINE | Facility: CLINIC | Age: 77
End: 2022-10-06

## 2022-10-06 VITALS
HEIGHT: 59 IN | HEART RATE: 50 BPM | OXYGEN SATURATION: 98 % | RESPIRATION RATE: 16 BRPM | DIASTOLIC BLOOD PRESSURE: 80 MMHG | WEIGHT: 136 LBS | TEMPERATURE: 97.5 F | BODY MASS INDEX: 27.42 KG/M2 | SYSTOLIC BLOOD PRESSURE: 134 MMHG

## 2022-10-06 PROCEDURE — G0008: CPT

## 2022-10-06 PROCEDURE — 90686 IIV4 VACC NO PRSV 0.5 ML IM: CPT

## 2022-10-06 PROCEDURE — 99214 OFFICE O/P EST MOD 30 MIN: CPT | Mod: 25

## 2022-10-06 RX ORDER — FLUTICASONE PROPIONATE 50 UG/1
50 SPRAY, METERED NASAL TWICE DAILY
Qty: 1 | Refills: 3 | Status: DISCONTINUED | COMMUNITY
Start: 2021-04-21 | End: 2022-10-06

## 2022-10-06 RX ORDER — ONDANSETRON 4 MG/1
4 TABLET ORAL
Qty: 9 | Refills: 0 | Status: DISCONTINUED | COMMUNITY
Start: 2021-08-25 | End: 2022-10-06

## 2022-10-06 NOTE — PHYSICAL EXAM
[Normal] : normal rate, regular rhythm, normal S1 and S2 and no murmur heard [de-identified] : Thoracic kyphosis

## 2022-10-06 NOTE — ASSESSMENT
[FreeTextEntry1] : Flu shot given check labs continue current meds consider discontinuation of Zetia or reduction of atorvastatin depending on lab results call patient and fax results to cardiologist and FYI

## 2022-10-06 NOTE — HISTORY OF PRESENT ILLNESS
[de-identified] : Status post gastric sleeve weekly x18 months is keeping weight off but has chronic GERD had hiatal hernia repair but it is recurring . symptoms controlled with omeprazole and sucralfate\par \par Her  very disabled with Parkinson's is stressful for her using Xanax extremely rarely

## 2022-10-09 LAB
ALBUMIN SERPL ELPH-MCNC: 4.3 G/DL
ALP BLD-CCNC: 117 U/L
ALT SERPL-CCNC: 100 U/L
ANION GAP SERPL CALC-SCNC: 12 MMOL/L
AST SERPL-CCNC: 59 U/L
BASOPHILS # BLD AUTO: 0.05 K/UL
BASOPHILS NFR BLD AUTO: 0.8 %
BILIRUB SERPL-MCNC: 0.7 MG/DL
BUN SERPL-MCNC: 25 MG/DL
CALCIUM SERPL-MCNC: 9.9 MG/DL
CHLORIDE SERPL-SCNC: 104 MMOL/L
CHOLEST SERPL-MCNC: 138 MG/DL
CO2 SERPL-SCNC: 27 MMOL/L
CREAT SERPL-MCNC: 0.85 MG/DL
EGFR: 71 ML/MIN/1.73M2
EOSINOPHIL # BLD AUTO: 0.23 K/UL
EOSINOPHIL NFR BLD AUTO: 3.9 %
ESTIMATED AVERAGE GLUCOSE: 128 MG/DL
FOLATE SERPL-MCNC: >20 NG/ML
GLUCOSE SERPL-MCNC: 100 MG/DL
HBA1C MFR BLD HPLC: 6.1 %
HCT VFR BLD CALC: 39.7 %
HDLC SERPL-MCNC: 71 MG/DL
HGB BLD-MCNC: 12.6 G/DL
IMM GRANULOCYTES NFR BLD AUTO: 0 %
LDLC SERPL CALC-MCNC: 54 MG/DL
LYMPHOCYTES # BLD AUTO: 2.7 K/UL
LYMPHOCYTES NFR BLD AUTO: 45.8 %
MAN DIFF?: NORMAL
MCHC RBC-ENTMCNC: 30 PG
MCHC RBC-ENTMCNC: 31.7 GM/DL
MCV RBC AUTO: 94.5 FL
MONOCYTES # BLD AUTO: 0.53 K/UL
MONOCYTES NFR BLD AUTO: 9 %
NEUTROPHILS # BLD AUTO: 2.38 K/UL
NEUTROPHILS NFR BLD AUTO: 40.5 %
NONHDLC SERPL-MCNC: 67 MG/DL
PLATELET # BLD AUTO: 180 K/UL
POTASSIUM SERPL-SCNC: 4.6 MMOL/L
PROT SERPL-MCNC: 6.3 G/DL
RBC # BLD: 4.2 M/UL
RBC # FLD: 15.4 %
SODIUM SERPL-SCNC: 143 MMOL/L
TRIGL SERPL-MCNC: 69 MG/DL
TSH SERPL-ACNC: 5.21 UIU/ML
VIT B12 SERPL-MCNC: 920 PG/ML
WBC # FLD AUTO: 5.89 K/UL

## 2022-10-28 ENCOUNTER — APPOINTMENT (OUTPATIENT)
Dept: FAMILY MEDICINE | Facility: CLINIC | Age: 77
End: 2022-10-28

## 2022-10-28 VITALS
SYSTOLIC BLOOD PRESSURE: 130 MMHG | HEART RATE: 60 BPM | TEMPERATURE: 97.2 F | DIASTOLIC BLOOD PRESSURE: 82 MMHG | BODY MASS INDEX: 27.42 KG/M2 | HEIGHT: 59 IN | OXYGEN SATURATION: 98 % | WEIGHT: 136 LBS

## 2022-10-28 DIAGNOSIS — M70.62 TROCHANTERIC BURSITIS, RIGHT HIP: ICD-10-CM

## 2022-10-28 DIAGNOSIS — M70.61 TROCHANTERIC BURSITIS, RIGHT HIP: ICD-10-CM

## 2022-10-28 PROCEDURE — 99213 OFFICE O/P EST LOW 20 MIN: CPT | Mod: 25

## 2022-10-28 PROCEDURE — 96372 THER/PROPH/DIAG INJ SC/IM: CPT

## 2022-10-28 RX ORDER — METHYLPRED ACET/NACL,ISO-OS/PF 40 MG/ML
40 VIAL (ML) INJECTION
Qty: 1 | Refills: 0 | Status: COMPLETED | OUTPATIENT
Start: 2022-10-28

## 2022-10-28 RX ADMIN — METHYLPREDNISOLONE ACETATE 0 MG/ML: 40 INJECTION, SUSPENSION INTRA-ARTICULAR; INTRALESIONAL; INTRAMUSCULAR; INTRASYNOVIAL; SOFT TISSUE at 00:00

## 2022-10-28 NOTE — HISTORY OF PRESENT ILLNESS
[FreeTextEntry8] : Recurrence of bilateral trochanteric bursitis classic type symptoms tenderness overlying bursa\par \par Alcohol wipe 1/2 cc Marcaine and 40 mg Medrol injected into both trochanteric bursa's patient will use Voltaren as needed

## 2022-11-12 ENCOUNTER — APPOINTMENT (OUTPATIENT)
Dept: MAMMOGRAPHY | Facility: CLINIC | Age: 77
End: 2022-11-12

## 2022-11-12 PROCEDURE — 77063 BREAST TOMOSYNTHESIS BI: CPT

## 2022-11-12 PROCEDURE — 77067 SCR MAMMO BI INCL CAD: CPT

## 2022-11-15 ENCOUNTER — APPOINTMENT (OUTPATIENT)
Dept: ULTRASOUND IMAGING | Facility: CLINIC | Age: 77
End: 2022-11-15

## 2022-11-15 PROCEDURE — 76641 ULTRASOUND BREAST COMPLETE: CPT | Mod: 50

## 2022-12-07 ENCOUNTER — APPOINTMENT (OUTPATIENT)
Dept: FAMILY MEDICINE | Facility: CLINIC | Age: 77
End: 2022-12-07

## 2022-12-07 VITALS
DIASTOLIC BLOOD PRESSURE: 76 MMHG | BODY MASS INDEX: 27.42 KG/M2 | OXYGEN SATURATION: 96 % | SYSTOLIC BLOOD PRESSURE: 122 MMHG | TEMPERATURE: 97.8 F | HEART RATE: 56 BPM | WEIGHT: 136 LBS | HEIGHT: 59 IN

## 2022-12-07 DIAGNOSIS — R23.2 FLUSHING: ICD-10-CM

## 2022-12-07 PROCEDURE — 99214 OFFICE O/P EST MOD 30 MIN: CPT | Mod: 25

## 2022-12-07 PROCEDURE — 36415 COLL VENOUS BLD VENIPUNCTURE: CPT

## 2022-12-07 NOTE — HISTORY OF PRESENT ILLNESS
[FreeTextEntry1] : Follow-up on elevated LFTs [de-identified] : Patient's atorvastatin has been decreased from 80 to 40 mg.  She remains completely asymptomatic except for nocturnal facial and back of neck flushing which lasts anywhere from 10 minutes to half an hour.  Has checked temperature twice she is afebrile\par \par Chronic GERD due to hiatal hernia antral surgeon responds to dietary modification omeprazole and Carafate\par \par  physical exam no apparent distress no lymphadenopathy no hepatosplenomegaly\par \par We will check labs call patient with results

## 2022-12-09 LAB
ALBUMIN SERPL ELPH-MCNC: 4.2 G/DL
ALP BLD-CCNC: 99 U/L
ALT SERPL-CCNC: 78 U/L
AST SERPL-CCNC: 37 U/L
BASOPHILS # BLD AUTO: 0.03 K/UL
BASOPHILS NFR BLD AUTO: 0.6 %
BILIRUB DIRECT SERPL-MCNC: 0.1 MG/DL
BILIRUB INDIRECT SERPL-MCNC: 0.3 MG/DL
BILIRUB SERPL-MCNC: 0.5 MG/DL
CRP SERPL-MCNC: <3 MG/L
EOSINOPHIL # BLD AUTO: 0.15 K/UL
EOSINOPHIL NFR BLD AUTO: 2.9 %
ERYTHROCYTE [SEDIMENTATION RATE] IN BLOOD BY WESTERGREN METHOD: 8 MM/HR
HCT VFR BLD CALC: 38 %
HGB BLD-MCNC: 12.4 G/DL
IMM GRANULOCYTES NFR BLD AUTO: 0.2 %
LDH SERPL-CCNC: 184 U/L
LYMPHOCYTES # BLD AUTO: 2.24 K/UL
LYMPHOCYTES NFR BLD AUTO: 43.9 %
MAN DIFF?: NORMAL
MCHC RBC-ENTMCNC: 30.8 PG
MCHC RBC-ENTMCNC: 32.6 GM/DL
MCV RBC AUTO: 94.3 FL
MONOCYTES # BLD AUTO: 0.45 K/UL
MONOCYTES NFR BLD AUTO: 8.8 %
NEUTROPHILS # BLD AUTO: 2.22 K/UL
NEUTROPHILS NFR BLD AUTO: 43.6 %
PLATELET # BLD AUTO: 176 K/UL
PROT SERPL-MCNC: 6.4 G/DL
RBC # BLD: 4.03 M/UL
RBC # FLD: 15.6 %
WBC # FLD AUTO: 5.1 K/UL

## 2023-05-31 ENCOUNTER — APPOINTMENT (OUTPATIENT)
Dept: FAMILY MEDICINE | Facility: CLINIC | Age: 78
End: 2023-05-31
Payer: MEDICARE

## 2023-05-31 VITALS
SYSTOLIC BLOOD PRESSURE: 132 MMHG | TEMPERATURE: 97.5 F | BODY MASS INDEX: 28.43 KG/M2 | OXYGEN SATURATION: 98 % | WEIGHT: 141 LBS | HEIGHT: 59 IN | DIASTOLIC BLOOD PRESSURE: 78 MMHG | HEART RATE: 57 BPM

## 2023-05-31 DIAGNOSIS — E78.00 PURE HYPERCHOLESTEROLEMIA, UNSPECIFIED: ICD-10-CM

## 2023-05-31 DIAGNOSIS — K21.9 GASTRO-ESOPHAGEAL REFLUX DISEASE W/OUT ESOPHAGITIS: ICD-10-CM

## 2023-05-31 PROCEDURE — 99214 OFFICE O/P EST MOD 30 MIN: CPT

## 2023-05-31 RX ORDER — CLOTRIMAZOLE AND BETAMETHASONE DIPROPIONATE 10; .5 MG/G; MG/G
1-0.05 CREAM TOPICAL TWICE DAILY
Qty: 1 | Refills: 3 | Status: ACTIVE | COMMUNITY
Start: 2019-03-11 | End: 1900-01-01

## 2023-05-31 NOTE — HISTORY OF PRESENT ILLNESS
[de-identified] : Reflux is becoming a problem post gastric bypass patient's diet is extremely restricted having problems with dysphagia and regurgitation.  Has followed up with Dr. Cast who proposes metallic balls to pull down her esophagus will refer to Dr. Vidales for further evaluation patient also needs colonoscopy\par \par Status post coronary stents and has appointment with cardiologist in the next 2 months she is not having any cardiovascular symptoms she is on atorvastatin 40 and is a Zetia 10\par \par She is caring for her  with advanced Parkinson's living in [place]

## 2023-05-31 NOTE — PHYSICAL EXAM
[Normal] : soft, non-tender, non-distended, no masses palpated, no HSM and normal bowel sounds [de-identified] : Status post stripping of varicose veins right leg very successful

## 2023-05-31 NOTE — ASSESSMENT
[FreeTextEntry1] : Carafate and omeprazole helpful for GERD referral to Dr. Vidales\par \par Check labs relative lymphocytosis last CBC

## 2023-06-01 ENCOUNTER — TRANSCRIPTION ENCOUNTER (OUTPATIENT)
Age: 78
End: 2023-06-01

## 2023-06-01 LAB
ALBUMIN SERPL ELPH-MCNC: 4.3 G/DL
ALP BLD-CCNC: 120 U/L
ALT SERPL-CCNC: 62 U/L
ANION GAP SERPL CALC-SCNC: 10 MMOL/L
AST SERPL-CCNC: 44 U/L
BILIRUB SERPL-MCNC: 0.6 MG/DL
BUN SERPL-MCNC: 23 MG/DL
CALCIUM SERPL-MCNC: 10.1 MG/DL
CHLORIDE SERPL-SCNC: 106 MMOL/L
CHOLEST SERPL-MCNC: 144 MG/DL
CO2 SERPL-SCNC: 28 MMOL/L
CREAT SERPL-MCNC: 0.9 MG/DL
EGFR: 66 ML/MIN/1.73M2
ESTIMATED AVERAGE GLUCOSE: 128 MG/DL
GLUCOSE SERPL-MCNC: 99 MG/DL
HBA1C MFR BLD HPLC: 6.1 %
HDLC SERPL-MCNC: 70 MG/DL
LDLC SERPL CALC-MCNC: 55 MG/DL
NONHDLC SERPL-MCNC: 74 MG/DL
POTASSIUM SERPL-SCNC: 4.8 MMOL/L
PROT SERPL-MCNC: 6.4 G/DL
SODIUM SERPL-SCNC: 144 MMOL/L
TRIGL SERPL-MCNC: 95 MG/DL
TSH SERPL-ACNC: 1.52 UIU/ML

## 2023-07-18 ENCOUNTER — RX CHANGE (OUTPATIENT)
Age: 78
End: 2023-07-18

## 2023-08-28 ENCOUNTER — RX RENEWAL (OUTPATIENT)
Age: 78
End: 2023-08-28

## 2023-09-13 ENCOUNTER — APPOINTMENT (OUTPATIENT)
Dept: FAMILY MEDICINE | Facility: CLINIC | Age: 78
End: 2023-09-13
Payer: MEDICARE

## 2023-09-13 VITALS
DIASTOLIC BLOOD PRESSURE: 80 MMHG | BODY MASS INDEX: 29.23 KG/M2 | HEIGHT: 59 IN | WEIGHT: 145 LBS | TEMPERATURE: 97 F | HEART RATE: 57 BPM | SYSTOLIC BLOOD PRESSURE: 128 MMHG | OXYGEN SATURATION: 95 %

## 2023-09-13 DIAGNOSIS — Z23 ENCOUNTER FOR IMMUNIZATION: ICD-10-CM

## 2023-09-13 DIAGNOSIS — G47.00 INSOMNIA, UNSPECIFIED: ICD-10-CM

## 2023-09-13 DIAGNOSIS — E03.9 HYPOTHYROIDISM, UNSPECIFIED: ICD-10-CM

## 2023-09-13 PROCEDURE — G0008: CPT

## 2023-09-13 PROCEDURE — 99214 OFFICE O/P EST MOD 30 MIN: CPT | Mod: 25

## 2023-09-13 PROCEDURE — 90662 IIV NO PRSV INCREASED AG IM: CPT

## 2023-09-13 RX ORDER — ALPRAZOLAM 0.25 MG/1
0.25 TABLET ORAL
Qty: 60 | Refills: 0 | Status: DISCONTINUED | COMMUNITY
Start: 2017-01-13 | End: 2023-09-13

## 2023-09-13 RX ORDER — SUCRALFATE 1 G/10ML
1 SUSPENSION ORAL 4 TIMES DAILY
Qty: 3 | Refills: 5 | Status: DISCONTINUED | COMMUNITY
Start: 2022-06-29 | End: 2023-09-13

## 2023-12-18 ENCOUNTER — NON-APPOINTMENT (OUTPATIENT)
Age: 78
End: 2023-12-18

## 2023-12-18 ENCOUNTER — APPOINTMENT (OUTPATIENT)
Dept: OPHTHALMOLOGY | Facility: CLINIC | Age: 78
End: 2023-12-18
Payer: MEDICARE

## 2023-12-18 PROCEDURE — 92004 COMPRE OPH EXAM NEW PT 1/>: CPT

## 2023-12-20 ENCOUNTER — RX CHANGE (OUTPATIENT)
Age: 78
End: 2023-12-20

## 2023-12-20 RX ORDER — OMEPRAZOLE 40 MG/1
40 CAPSULE, DELAYED RELEASE ORAL
Qty: 90 | Refills: 3 | Status: ACTIVE | COMMUNITY
Start: 1900-01-01 | End: 1900-01-01

## 2023-12-20 RX ORDER — OMEPRAZOLE 40 MG/1
40 CAPSULE, DELAYED RELEASE ORAL
Qty: 60 | Refills: 5 | Status: DISCONTINUED | COMMUNITY
End: 2023-12-20

## 2024-01-12 ENCOUNTER — NON-APPOINTMENT (OUTPATIENT)
Age: 79
End: 2024-01-12

## 2024-01-27 ENCOUNTER — TRANSCRIPTION ENCOUNTER (OUTPATIENT)
Age: 79
End: 2024-01-27

## 2024-05-03 ENCOUNTER — APPOINTMENT (OUTPATIENT)
Dept: FAMILY MEDICINE | Facility: CLINIC | Age: 79
End: 2024-05-03
Payer: MEDICARE

## 2024-05-03 ENCOUNTER — LABORATORY RESULT (OUTPATIENT)
Age: 79
End: 2024-05-03

## 2024-05-03 VITALS
HEART RATE: 71 BPM | DIASTOLIC BLOOD PRESSURE: 80 MMHG | BODY MASS INDEX: 29.84 KG/M2 | OXYGEN SATURATION: 96 % | TEMPERATURE: 97.3 F | HEIGHT: 59 IN | WEIGHT: 148 LBS | SYSTOLIC BLOOD PRESSURE: 128 MMHG

## 2024-05-03 DIAGNOSIS — I25.10 ATHEROSCLEROTIC HEART DISEASE OF NATIVE CORONARY ARTERY W/OUT ANGINA PECTORIS: ICD-10-CM

## 2024-05-03 DIAGNOSIS — Z00.00 ENCOUNTER FOR GENERAL ADULT MEDICAL EXAMINATION W/OUT ABNORMAL FINDINGS: ICD-10-CM

## 2024-05-03 DIAGNOSIS — Z13.820 ENCOUNTER FOR SCREENING FOR OSTEOPOROSIS: ICD-10-CM

## 2024-05-03 DIAGNOSIS — R92.30 DENSE BREASTS, UNSPECIFIED: ICD-10-CM

## 2024-05-03 DIAGNOSIS — I10 ESSENTIAL (PRIMARY) HYPERTENSION: ICD-10-CM

## 2024-05-03 DIAGNOSIS — E78.5 HYPERLIPIDEMIA, UNSPECIFIED: ICD-10-CM

## 2024-05-03 DIAGNOSIS — R79.89 OTHER SPECIFIED ABNORMAL FINDINGS OF BLOOD CHEMISTRY: ICD-10-CM

## 2024-05-03 PROCEDURE — 99214 OFFICE O/P EST MOD 30 MIN: CPT

## 2024-05-03 RX ORDER — AMMONIUM LACTATE 12 %
12 CREAM (GRAM) TOPICAL TWICE DAILY
Qty: 1 | Refills: 3 | Status: ACTIVE | COMMUNITY
Start: 1900-01-01 | End: 1900-01-01

## 2024-05-03 RX ORDER — EZETIMIBE 10 MG/1
10 TABLET ORAL DAILY
Qty: 90 | Refills: 3 | Status: ACTIVE | COMMUNITY
Start: 2022-06-16 | End: 1900-01-01

## 2024-05-03 RX ORDER — BETAMETHASONE DIPROPIONATE 0.5 MG/G
0.05 CREAM, AUGMENTED TOPICAL TWICE DAILY
Qty: 1 | Refills: 2 | Status: ACTIVE | COMMUNITY
Start: 2023-07-18 | End: 1900-01-01

## 2024-05-03 NOTE — HISTORY OF PRESENT ILLNESS
[de-identified] : Recent CAT scan at [facility] for abdominal pain showed small hiatal hernia bilateral fat-containing inguinal hernias otherwise unremarkable except for sleeve gastrectomy and diverticulosis  Patient has sense of urinary urgency she is status post bladder sling will check urine analysis  Hypothyroidism on levothyroxine  Chronic thoracic and low back pain is losing height we will check bone density and x-rays  Overdue for mammogram history of dense breasts  Using betamethasone for rash in [place] fold  Status post coronary stents no cardiovascular symptoms with exertion slightly elevated LFTs on atorvastatin

## 2024-05-03 NOTE — REVIEW OF SYSTEMS
[Frequency] : frequency [Back Pain] : back pain [Negative] : Gastrointestinal [FreeTextEntry8] : Urgency

## 2024-05-08 LAB
ALBUMIN SERPL ELPH-MCNC: 4.2 G/DL
ALP BLD-CCNC: 107 U/L
ALT SERPL-CCNC: 36 U/L
ANION GAP SERPL CALC-SCNC: 10 MMOL/L
APPEARANCE: CLEAR
AST SERPL-CCNC: 29 U/L
BASOPHILS # BLD AUTO: 0.06 K/UL
BASOPHILS NFR BLD AUTO: 1 %
BILIRUB SERPL-MCNC: 0.6 MG/DL
BILIRUBIN URINE: NEGATIVE
BLOOD URINE: NEGATIVE
BUN SERPL-MCNC: 12 MG/DL
CALCIUM SERPL-MCNC: 9.3 MG/DL
CHLORIDE SERPL-SCNC: 105 MMOL/L
CHOLEST SERPL-MCNC: 139 MG/DL
CO2 SERPL-SCNC: 27 MMOL/L
COLOR: YELLOW
CREAT SERPL-MCNC: 0.81 MG/DL
EGFR: 75 ML/MIN/1.73M2
EOSINOPHIL # BLD AUTO: 0.21 K/UL
EOSINOPHIL NFR BLD AUTO: 3.4 %
ESTIMATED AVERAGE GLUCOSE: 123 MG/DL
GLUCOSE QUALITATIVE U: NEGATIVE MG/DL
GLUCOSE SERPL-MCNC: 104 MG/DL
HBA1C MFR BLD HPLC: 5.9 %
HCT VFR BLD CALC: 40.1 %
HDLC SERPL-MCNC: 72 MG/DL
HGB BLD-MCNC: 12.6 G/DL
IMM GRANULOCYTES NFR BLD AUTO: 0.2 %
KETONES URINE: NEGATIVE MG/DL
LDLC SERPL CALC-MCNC: 49 MG/DL
LEUKOCYTE ESTERASE URINE: ABNORMAL
LYMPHOCYTES # BLD AUTO: 2.5 K/UL
LYMPHOCYTES NFR BLD AUTO: 40 %
MAN DIFF?: NORMAL
MCHC RBC-ENTMCNC: 29.8 PG
MCHC RBC-ENTMCNC: 31.4 GM/DL
MCV RBC AUTO: 94.8 FL
MONOCYTES # BLD AUTO: 0.61 K/UL
MONOCYTES NFR BLD AUTO: 9.8 %
NEUTROPHILS # BLD AUTO: 2.86 K/UL
NEUTROPHILS NFR BLD AUTO: 45.6 %
NITRITE URINE: NEGATIVE
NONHDLC SERPL-MCNC: 67 MG/DL
PH URINE: 6.5
PLATELET # BLD AUTO: 198 K/UL
POTASSIUM SERPL-SCNC: 4.3 MMOL/L
PROT SERPL-MCNC: 6.3 G/DL
PROTEIN URINE: NEGATIVE MG/DL
RBC # BLD: 4.23 M/UL
RBC # FLD: 15.4 %
SODIUM SERPL-SCNC: 142 MMOL/L
SPECIFIC GRAVITY URINE: 1.01
TRIGL SERPL-MCNC: 96 MG/DL
TSH SERPL-ACNC: 2.97 UIU/ML
UROBILINOGEN URINE: 0.2 MG/DL
WBC # FLD AUTO: 6.25 K/UL

## 2024-06-07 ENCOUNTER — APPOINTMENT (OUTPATIENT)
Dept: MAMMOGRAPHY | Facility: CLINIC | Age: 79
End: 2024-06-07
Payer: MEDICARE

## 2024-06-07 ENCOUNTER — RESULT REVIEW (OUTPATIENT)
Age: 79
End: 2024-06-07

## 2024-06-07 ENCOUNTER — APPOINTMENT (OUTPATIENT)
Dept: RADIOLOGY | Facility: CLINIC | Age: 79
End: 2024-06-07

## 2024-06-07 PROCEDURE — 72110 X-RAY EXAM L-2 SPINE 4/>VWS: CPT

## 2024-06-07 PROCEDURE — 77080 DXA BONE DENSITY AXIAL: CPT

## 2024-06-07 PROCEDURE — 72070 X-RAY EXAM THORAC SPINE 2VWS: CPT

## 2024-06-07 PROCEDURE — 77067 SCR MAMMO BI INCL CAD: CPT

## 2024-06-07 PROCEDURE — 77063 BREAST TOMOSYNTHESIS BI: CPT

## 2024-06-11 ENCOUNTER — NON-APPOINTMENT (OUTPATIENT)
Age: 79
End: 2024-06-11

## 2024-06-13 DIAGNOSIS — M48.50XA COLLAPSED VERTEBRA, NOT ELSEWHERE CLASSIFIED, SITE UNSPECIFIED, INITIAL ENCOUNTER FOR FRACTURE: ICD-10-CM

## 2024-06-13 RX ORDER — ALENDRONATE SODIUM 70 MG/1
70 TABLET ORAL
Qty: 12 | Refills: 3 | Status: ACTIVE | COMMUNITY
Start: 2024-06-13 | End: 1900-01-01

## 2024-08-28 ENCOUNTER — RX RENEWAL (OUTPATIENT)
Age: 79
End: 2024-08-28

## 2024-09-04 ENCOUNTER — NON-APPOINTMENT (OUTPATIENT)
Age: 79
End: 2024-09-04

## 2024-10-17 ENCOUNTER — NON-APPOINTMENT (OUTPATIENT)
Age: 79
End: 2024-10-17

## 2024-11-08 ENCOUNTER — APPOINTMENT (OUTPATIENT)
Dept: FAMILY MEDICINE | Facility: CLINIC | Age: 79
End: 2024-11-08
Payer: MEDICARE

## 2024-11-08 ENCOUNTER — LABORATORY RESULT (OUTPATIENT)
Age: 79
End: 2024-11-08

## 2024-11-08 VITALS
OXYGEN SATURATION: 97 % | WEIGHT: 150 LBS | HEIGHT: 59 IN | SYSTOLIC BLOOD PRESSURE: 126 MMHG | BODY MASS INDEX: 30.24 KG/M2 | TEMPERATURE: 97.1 F | HEART RATE: 72 BPM | DIASTOLIC BLOOD PRESSURE: 80 MMHG

## 2024-11-08 DIAGNOSIS — E03.9 HYPOTHYROIDISM, UNSPECIFIED: ICD-10-CM

## 2024-11-08 DIAGNOSIS — I25.10 ATHEROSCLEROTIC HEART DISEASE OF NATIVE CORONARY ARTERY W/OUT ANGINA PECTORIS: ICD-10-CM

## 2024-11-08 DIAGNOSIS — M70.62 TROCHANTERIC BURSITIS, RIGHT HIP: ICD-10-CM

## 2024-11-08 DIAGNOSIS — G89.29 PAIN IN THORACIC SPINE: ICD-10-CM

## 2024-11-08 DIAGNOSIS — E66.9 OBESITY, UNSPECIFIED: ICD-10-CM

## 2024-11-08 DIAGNOSIS — R79.89 OTHER SPECIFIED ABNORMAL FINDINGS OF BLOOD CHEMISTRY: ICD-10-CM

## 2024-11-08 DIAGNOSIS — E78.5 HYPERLIPIDEMIA, UNSPECIFIED: ICD-10-CM

## 2024-11-08 DIAGNOSIS — M70.61 TROCHANTERIC BURSITIS, RIGHT HIP: ICD-10-CM

## 2024-11-08 DIAGNOSIS — E83.42 HYPOMAGNESEMIA: ICD-10-CM

## 2024-11-08 DIAGNOSIS — Z00.00 ENCOUNTER FOR GENERAL ADULT MEDICAL EXAMINATION W/OUT ABNORMAL FINDINGS: ICD-10-CM

## 2024-11-08 DIAGNOSIS — I10 ESSENTIAL (PRIMARY) HYPERTENSION: ICD-10-CM

## 2024-11-08 DIAGNOSIS — M54.6 PAIN IN THORACIC SPINE: ICD-10-CM

## 2024-11-08 DIAGNOSIS — M48.50XA COLLAPSED VERTEBRA, NOT ELSEWHERE CLASSIFIED, SITE UNSPECIFIED, INITIAL ENCOUNTER FOR FRACTURE: ICD-10-CM

## 2024-11-08 DIAGNOSIS — G47.00 INSOMNIA, UNSPECIFIED: ICD-10-CM

## 2024-11-08 DIAGNOSIS — K21.9 GASTRO-ESOPHAGEAL REFLUX DISEASE W/OUT ESOPHAGITIS: ICD-10-CM

## 2024-11-08 PROCEDURE — 90662 IIV NO PRSV INCREASED AG IM: CPT

## 2024-11-08 PROCEDURE — G0008: CPT

## 2024-11-08 PROCEDURE — G0439: CPT

## 2024-11-08 PROCEDURE — 96372 THER/PROPH/DIAG INJ SC/IM: CPT

## 2024-11-08 RX ORDER — METHYLPRED ACET/NACL,ISO-OS/PF 40 MG/ML
40 VIAL (ML) INJECTION
Qty: 0 | Refills: 0 | Status: COMPLETED | OUTPATIENT
Start: 2024-11-08

## 2024-11-08 RX ADMIN — METHYLPREDNISOLONE ACETATE 0 MG/ML: 40 INJECTION, SUSPENSION INTRA-ARTICULAR; INTRALESIONAL; INTRAMUSCULAR; SOFT TISSUE at 00:00

## 2024-11-09 LAB
25(OH)D3 SERPL-MCNC: 35.9 NG/ML
ALBUMIN SERPL ELPH-MCNC: 3.8 G/DL
ALP BLD-CCNC: 127 U/L
ALT SERPL-CCNC: 51 U/L
ANION GAP SERPL CALC-SCNC: 12 MMOL/L
APPEARANCE: CLEAR
AST SERPL-CCNC: 32 U/L
BASOPHILS # BLD AUTO: 0.06 K/UL
BASOPHILS NFR BLD AUTO: 1 %
BILIRUB SERPL-MCNC: 0.5 MG/DL
BILIRUBIN URINE: NEGATIVE
BLOOD URINE: NEGATIVE
BUN SERPL-MCNC: 20 MG/DL
CALCIUM SERPL-MCNC: 9.2 MG/DL
CHLORIDE SERPL-SCNC: 103 MMOL/L
CHOLEST SERPL-MCNC: 143 MG/DL
CO2 SERPL-SCNC: 25 MMOL/L
COLOR: YELLOW
CREAT SERPL-MCNC: 0.85 MG/DL
EGFR: 71 ML/MIN/1.73M2
EOSINOPHIL # BLD AUTO: 0.19 K/UL
EOSINOPHIL NFR BLD AUTO: 3.1 %
ESTIMATED AVERAGE GLUCOSE: 117 MG/DL
FOLATE SERPL-MCNC: >20 NG/ML
GLUCOSE QUALITATIVE U: NEGATIVE MG/DL
GLUCOSE SERPL-MCNC: 98 MG/DL
HBA1C MFR BLD HPLC: 5.7 %
HCT VFR BLD CALC: 38 %
HDLC SERPL-MCNC: 64 MG/DL
HGB BLD-MCNC: 12.2 G/DL
IMM GRANULOCYTES NFR BLD AUTO: 0.2 %
KETONES URINE: NEGATIVE MG/DL
LDLC SERPL CALC-MCNC: 60 MG/DL
LEUKOCYTE ESTERASE URINE: ABNORMAL
LYMPHOCYTES # BLD AUTO: 2.22 K/UL
LYMPHOCYTES NFR BLD AUTO: 36.2 %
MAGNESIUM SERPL-MCNC: 2 MG/DL
MAN DIFF?: NORMAL
MCHC RBC-ENTMCNC: 29.2 PG
MCHC RBC-ENTMCNC: 32.1 G/DL
MCV RBC AUTO: 90.9 FL
MONOCYTES # BLD AUTO: 0.59 K/UL
MONOCYTES NFR BLD AUTO: 9.6 %
NEUTROPHILS # BLD AUTO: 3.07 K/UL
NEUTROPHILS NFR BLD AUTO: 49.9 %
NITRITE URINE: NEGATIVE
NONHDLC SERPL-MCNC: 79 MG/DL
PH URINE: 6
PLATELET # BLD AUTO: 204 K/UL
POTASSIUM SERPL-SCNC: 4.4 MMOL/L
PROT SERPL-MCNC: 6.1 G/DL
PROTEIN URINE: NEGATIVE MG/DL
RBC # BLD: 4.18 M/UL
RBC # FLD: 15.9 %
SODIUM SERPL-SCNC: 140 MMOL/L
SPECIFIC GRAVITY URINE: 1.01
TRIGL SERPL-MCNC: 108 MG/DL
TSH SERPL-ACNC: 1 UIU/ML
UROBILINOGEN URINE: 0.2 MG/DL
VIT B12 SERPL-MCNC: 714 PG/ML
WBC # FLD AUTO: 6.14 K/UL

## 2025-01-07 ENCOUNTER — TRANSCRIPTION ENCOUNTER (OUTPATIENT)
Age: 80
End: 2025-01-07

## 2025-01-15 ENCOUNTER — TRANSCRIPTION ENCOUNTER (OUTPATIENT)
Age: 80
End: 2025-01-15

## 2025-03-05 ENCOUNTER — NON-APPOINTMENT (OUTPATIENT)
Age: 80
End: 2025-03-05

## 2025-03-05 ENCOUNTER — TRANSCRIPTION ENCOUNTER (OUTPATIENT)
Age: 80
End: 2025-03-05

## 2025-03-21 ENCOUNTER — NON-APPOINTMENT (OUTPATIENT)
Age: 80
End: 2025-03-21

## 2025-05-13 ENCOUNTER — TRANSCRIPTION ENCOUNTER (OUTPATIENT)
Age: 80
End: 2025-05-13

## 2025-07-15 ENCOUNTER — NON-APPOINTMENT (OUTPATIENT)
Age: 80
End: 2025-07-15

## 2025-07-16 ENCOUNTER — APPOINTMENT (OUTPATIENT)
Dept: FAMILY MEDICINE | Facility: CLINIC | Age: 80
End: 2025-07-16
Payer: MEDICARE

## 2025-07-16 ENCOUNTER — NON-APPOINTMENT (OUTPATIENT)
Age: 80
End: 2025-07-16

## 2025-07-16 VITALS
BODY MASS INDEX: 30.24 KG/M2 | TEMPERATURE: 97.2 F | DIASTOLIC BLOOD PRESSURE: 70 MMHG | RESPIRATION RATE: 16 BRPM | WEIGHT: 150 LBS | OXYGEN SATURATION: 97 % | SYSTOLIC BLOOD PRESSURE: 138 MMHG | HEIGHT: 59 IN | HEART RATE: 71 BPM

## 2025-07-16 PROBLEM — Z95.1 S/P CABG X 1: Status: ACTIVE | Noted: 2025-07-16

## 2025-07-16 PROCEDURE — 99214 OFFICE O/P EST MOD 30 MIN: CPT

## 2025-07-24 ENCOUNTER — TRANSCRIPTION ENCOUNTER (OUTPATIENT)
Age: 80
End: 2025-07-24

## 2025-09-11 ENCOUNTER — NON-APPOINTMENT (OUTPATIENT)
Age: 80
End: 2025-09-11

## 2025-09-11 ENCOUNTER — TRANSCRIPTION ENCOUNTER (OUTPATIENT)
Age: 80
End: 2025-09-11